# Patient Record
Sex: MALE | Employment: FULL TIME | URBAN - METROPOLITAN AREA
[De-identification: names, ages, dates, MRNs, and addresses within clinical notes are randomized per-mention and may not be internally consistent; named-entity substitution may affect disease eponyms.]

---

## 2017-02-09 ENCOUNTER — OFFICE VISIT (OUTPATIENT)
Dept: FAMILY MEDICINE CLINIC | Facility: CLINIC | Age: 47
End: 2017-02-09

## 2017-02-09 VITALS
TEMPERATURE: 98 F | BODY MASS INDEX: 23.19 KG/M2 | RESPIRATION RATE: 18 BRPM | WEIGHT: 162 LBS | DIASTOLIC BLOOD PRESSURE: 73 MMHG | SYSTOLIC BLOOD PRESSURE: 106 MMHG | HEART RATE: 66 BPM | HEIGHT: 70 IN

## 2017-02-09 DIAGNOSIS — J02.9 SORE THROAT: ICD-10-CM

## 2017-02-09 PROCEDURE — 99212 OFFICE O/P EST SF 10 MIN: CPT | Performed by: FAMILY MEDICINE

## 2017-02-09 PROCEDURE — 99213 OFFICE O/P EST LOW 20 MIN: CPT | Performed by: FAMILY MEDICINE

## 2017-02-09 RX ORDER — AMOXICILLIN 875 MG/1
875 TABLET, COATED ORAL 2 TIMES DAILY
Qty: 20 TABLET | Refills: 0 | Status: SHIPPED | OUTPATIENT
Start: 2017-02-09 | End: 2017-05-25

## 2017-02-09 NOTE — PROGRESS NOTES
HPI:    Patient ID: Gibson Him is a 40year old male. HPI Comments: Pt presents with cold symptoms for 4-5 days. Pt has had cough, sore throat. No fevers. Pt has tried otc remedies without relief. Pt states several sick contacts.      Sore Throat defined types were placed in this encounter. Meds This Visit:  Signed Prescriptions Disp Refills    amoxicillin 875 MG Oral Tab 20 tablet 0      Sig: Take 1 tablet (875 mg total) by mouth 2 (two) times daily.            Imaging & Referrals:  None

## 2017-02-21 NOTE — TELEPHONE ENCOUNTER
Pharmacy stts they tried to escript Rx Zolpidem 10 mg.  Please advise         Current Outpatient Prescriptions:        Zolpidem Tartrate 10 MG Oral Tab TAKE 1 TABLET BY MOUTH EVERY NIGHT AT BEDTIME Disp: 90 tablet Rfl: 0

## 2017-02-26 RX ORDER — ZOLPIDEM TARTRATE 10 MG/1
TABLET ORAL
Qty: 90 TABLET | Refills: 0 | Status: CANCELLED | OUTPATIENT
Start: 2017-02-26

## 2017-02-26 NOTE — TELEPHONE ENCOUNTER
Called pharmacy and this was already filled for #30 on 2/19 and too early to refill. Can notify patient about this.

## 2017-02-26 NOTE — TELEPHONE ENCOUNTER
Please advise on RF    · Appointment scheduled in the past 6 months or in the next 3 months  Recent Visits       Provider Department Primary Dx    2 weeks ago Ramon Suero MD [facility] SPECIALTY [facility], [facility] [place], [place] Sore throat    1 month ago Lanie Levin

## 2017-02-28 NOTE — TELEPHONE ENCOUNTER
Pharmacy was the one who had sent refill request and so there is no need to call pt about this. Closing encounter.

## 2017-04-17 ENCOUNTER — OFFICE VISIT (OUTPATIENT)
Dept: FAMILY MEDICINE CLINIC | Facility: CLINIC | Age: 47
End: 2017-04-17

## 2017-04-17 VITALS
SYSTOLIC BLOOD PRESSURE: 129 MMHG | DIASTOLIC BLOOD PRESSURE: 77 MMHG | TEMPERATURE: 98 F | BODY MASS INDEX: 22.62 KG/M2 | HEART RATE: 65 BPM | HEIGHT: 70 IN | WEIGHT: 158 LBS

## 2017-04-17 DIAGNOSIS — Z00.00 ROUTINE GENERAL MEDICAL EXAMINATION AT A HEALTH CARE FACILITY: ICD-10-CM

## 2017-04-17 DIAGNOSIS — R20.9 COLD HANDS AND FEET: Primary | ICD-10-CM

## 2017-04-17 DIAGNOSIS — H65.01 RIGHT ACUTE SEROUS OTITIS MEDIA, RECURRENCE NOT SPECIFIED: ICD-10-CM

## 2017-04-17 PROCEDURE — 99213 OFFICE O/P EST LOW 20 MIN: CPT | Performed by: PHYSICIAN ASSISTANT

## 2017-04-17 PROCEDURE — 99212 OFFICE O/P EST SF 10 MIN: CPT | Performed by: PHYSICIAN ASSISTANT

## 2017-04-17 RX ORDER — FLUTICASONE PROPIONATE 50 MCG
2 SPRAY, SUSPENSION (ML) NASAL DAILY
Qty: 1 BOTTLE | Refills: 1 | Status: SHIPPED | OUTPATIENT
Start: 2017-04-17 | End: 2017-10-03

## 2017-04-17 NOTE — PROGRESS NOTES
HPI:    Patient ID: Kade Cruz is a 40year old male. HPI Comments: Patient presents for feeling \"something moving\" inside right ear for past week. He has some mild discomfort in ear. He denies muffled hearing or popping in ear.   He denies all well-nourished. No distress. HENT:   Head: Normocephalic and atraumatic. Right Ear: Ear canal normal. A middle ear effusion is present.    Left Ear: Tympanic membrane and ear canal normal.   Mouth/Throat: Oropharynx is clear and moist and mucous membran

## 2017-04-18 ENCOUNTER — LAB ENCOUNTER (OUTPATIENT)
Dept: LAB | Age: 47
End: 2017-04-18
Attending: PHYSICIAN ASSISTANT
Payer: COMMERCIAL

## 2017-04-18 DIAGNOSIS — Z00.00 ROUTINE GENERAL MEDICAL EXAMINATION AT A HEALTH CARE FACILITY: ICD-10-CM

## 2017-04-18 DIAGNOSIS — R20.9 COLD HANDS AND FEET: ICD-10-CM

## 2017-04-18 PROCEDURE — 80061 LIPID PANEL: CPT

## 2017-04-18 PROCEDURE — 36415 COLL VENOUS BLD VENIPUNCTURE: CPT

## 2017-04-18 PROCEDURE — 80053 COMPREHEN METABOLIC PANEL: CPT

## 2017-04-18 PROCEDURE — 85025 COMPLETE CBC W/AUTO DIFF WBC: CPT

## 2017-04-18 PROCEDURE — 84443 ASSAY THYROID STIM HORMONE: CPT

## 2017-05-20 RX ORDER — ZOLPIDEM TARTRATE 10 MG/1
TABLET ORAL
Qty: 90 TABLET | Refills: 0 | OUTPATIENT
Start: 2017-05-20 | End: 2017-08-23

## 2017-05-20 NOTE — TELEPHONE ENCOUNTER
Message noted. Chart reviewed. May refill medication as requested. Script reviewed and signed. Please call into pharmacy as this is controlled substance and notify patient. Thanks.

## 2017-05-25 ENCOUNTER — OFFICE VISIT (OUTPATIENT)
Dept: FAMILY MEDICINE CLINIC | Facility: CLINIC | Age: 47
End: 2017-05-25

## 2017-05-25 VITALS
HEIGHT: 70 IN | WEIGHT: 161 LBS | BODY MASS INDEX: 23.05 KG/M2 | DIASTOLIC BLOOD PRESSURE: 72 MMHG | SYSTOLIC BLOOD PRESSURE: 116 MMHG | TEMPERATURE: 98 F | HEART RATE: 63 BPM | RESPIRATION RATE: 18 BRPM

## 2017-05-25 DIAGNOSIS — H65.91 RIGHT SEROUS OTITIS MEDIA, UNSPECIFIED CHRONICITY: Primary | ICD-10-CM

## 2017-05-25 PROCEDURE — 99212 OFFICE O/P EST SF 10 MIN: CPT | Performed by: FAMILY MEDICINE

## 2017-05-25 PROCEDURE — 99213 OFFICE O/P EST LOW 20 MIN: CPT | Performed by: FAMILY MEDICINE

## 2017-05-25 NOTE — PROGRESS NOTES
HPI:    Patient ID: Jessica Duque is a 40year old male. HPI Comments: Pt presents with intermittent right ear fullness and dry mouth. . Pt  also takes medication for insomnia which usually helps. Pt has had some increased sensitivity to noises.  Pt w encounter.        Meds This Visit:  No prescriptions requested or ordered in this encounter    Imaging & Referrals:  ENT - INTERNAL       ER#[id number]

## 2017-08-23 RX ORDER — ZOLPIDEM TARTRATE 10 MG/1
TABLET ORAL
Qty: 90 TABLET | Refills: 0 | Status: SHIPPED
Start: 2017-08-23 | End: 2017-11-27

## 2017-10-03 ENCOUNTER — OFFICE VISIT (OUTPATIENT)
Dept: FAMILY MEDICINE CLINIC | Facility: CLINIC | Age: 47
End: 2017-10-03

## 2017-10-03 VITALS
WEIGHT: 157 LBS | DIASTOLIC BLOOD PRESSURE: 72 MMHG | HEIGHT: 70 IN | SYSTOLIC BLOOD PRESSURE: 103 MMHG | HEART RATE: 61 BPM | TEMPERATURE: 98 F | BODY MASS INDEX: 22.48 KG/M2

## 2017-10-03 DIAGNOSIS — M77.30 CALCANEAL SPUR, UNSPECIFIED LATERALITY: ICD-10-CM

## 2017-10-03 DIAGNOSIS — J06.9 ACUTE URI: ICD-10-CM

## 2017-10-03 PROCEDURE — 99213 OFFICE O/P EST LOW 20 MIN: CPT | Performed by: FAMILY MEDICINE

## 2017-10-03 PROCEDURE — 99212 OFFICE O/P EST SF 10 MIN: CPT | Performed by: FAMILY MEDICINE

## 2017-10-03 NOTE — PROGRESS NOTES
HPI:    Patient ID: Teddy Polanco is a 56year old male. Pt presents with cold symptoms for 4 days. Pt has had cough, congestion, sore throat. No fevers. Pt has tried otc remedies without relief. Pt states no sick contacts.    Pt also has had hx of he JZ#[id number]

## 2017-11-27 RX ORDER — ZOLPIDEM TARTRATE 10 MG/1
TABLET ORAL
Qty: 90 TABLET | Refills: 0 | Status: SHIPPED
Start: 2017-11-27 | End: 2018-02-26

## 2017-12-07 ENCOUNTER — OFFICE VISIT (OUTPATIENT)
Dept: PODIATRY CLINIC | Facility: CLINIC | Age: 47
End: 2017-12-07

## 2017-12-07 DIAGNOSIS — M72.2 PLANTAR FASCIITIS OF RIGHT FOOT: Primary | ICD-10-CM

## 2017-12-07 PROCEDURE — 99243 OFF/OP CNSLTJ NEW/EST LOW 30: CPT | Performed by: PODIATRIST

## 2017-12-07 PROCEDURE — L3060 FOOT ARCH SUPP LONGITUD/META: HCPCS | Performed by: PODIATRIST

## 2017-12-07 NOTE — PROGRESS NOTES
HPI:    Patient ID: Kade Cruz is a 56year old male. HPI   This 43-year-old female presents as a new patient to me and is here on consult from [facility]. Her chief complaint is pain associated with the right heel.   The pain is been present for a encounter    Imaging & Referrals:  None       #[id number]

## 2018-01-04 ENCOUNTER — OFFICE VISIT (OUTPATIENT)
Dept: PODIATRY CLINIC | Facility: CLINIC | Age: 48
End: 2018-01-04

## 2018-01-04 DIAGNOSIS — M72.2 PLANTAR FASCIITIS OF RIGHT FOOT: Primary | ICD-10-CM

## 2018-01-04 PROCEDURE — 99212 OFFICE O/P EST SF 10 MIN: CPT | Performed by: PODIATRIST

## 2018-01-04 NOTE — PROGRESS NOTES
HPI:    Patient ID: Tom Us is a 56year old male. HPI  This 56-year-old male presents for follow-up and further considerations of care in reference to right heel pain.   Patient states he is doing very well and has minimal discomfort and in act

## 2018-01-25 ENCOUNTER — OFFICE VISIT (OUTPATIENT)
Dept: PODIATRY CLINIC | Facility: CLINIC | Age: 48
End: 2018-01-25

## 2018-01-25 DIAGNOSIS — M72.2 PLANTAR FASCIITIS OF RIGHT FOOT: Primary | ICD-10-CM

## 2018-01-25 PROCEDURE — 99212 OFFICE O/P EST SF 10 MIN: CPT | Performed by: PODIATRIST

## 2018-01-25 NOTE — PROGRESS NOTES
HPI:    Patient ID: Cora Moise is a 56year old male. HPI  This 52-year-old male presents for follow-up care in reference to right heel pain. Since last visit he states that he continues to steadily improve and is getting better.   He states that

## 2018-02-27 RX ORDER — ZOLPIDEM TARTRATE 10 MG/1
TABLET ORAL
Qty: 90 TABLET | Refills: 0 | Status: SHIPPED | OUTPATIENT
Start: 2018-02-27 | End: 2018-06-06

## 2018-04-04 ENCOUNTER — OFFICE VISIT (OUTPATIENT)
Dept: FAMILY MEDICINE CLINIC | Facility: CLINIC | Age: 48
End: 2018-04-04

## 2018-04-04 VITALS
TEMPERATURE: 98 F | SYSTOLIC BLOOD PRESSURE: 101 MMHG | BODY MASS INDEX: 22.19 KG/M2 | DIASTOLIC BLOOD PRESSURE: 67 MMHG | HEIGHT: 70 IN | WEIGHT: 155 LBS | HEART RATE: 61 BPM

## 2018-04-04 DIAGNOSIS — R68.89 FLU-LIKE SYMPTOMS: ICD-10-CM

## 2018-04-04 DIAGNOSIS — G47.00 INSOMNIA, UNSPECIFIED TYPE: Primary | ICD-10-CM

## 2018-04-04 PROCEDURE — 99213 OFFICE O/P EST LOW 20 MIN: CPT | Performed by: PHYSICIAN ASSISTANT

## 2018-04-04 PROCEDURE — 99212 OFFICE O/P EST SF 10 MIN: CPT | Performed by: PHYSICIAN ASSISTANT

## 2018-04-04 RX ORDER — IBUPROFEN 600 MG/1
600 TABLET ORAL EVERY 6 HOURS PRN
Qty: 30 TABLET | Refills: 1 | Status: SHIPPED | OUTPATIENT
Start: 2018-04-04 | End: 2018-07-27

## 2018-04-04 NOTE — PROGRESS NOTES
HPI:    Patient ID: Aag Jeffrey is a 56year old male. Patient presents for fever, body aches, fatigue and cough for past five days. Patient feels symptoms have almost completely resolved today. He denies congestion.   He had sore throat that has and well-nourished. No distress. HENT:   Head: Normocephalic and atraumatic.    Right Ear: Tympanic membrane and ear canal normal.   Left Ear: Tympanic membrane and ear canal normal.   Mouth/Throat: Uvula is midline, oropharynx is clear and moist and muco

## 2018-06-06 RX ORDER — ZOLPIDEM TARTRATE 10 MG/1
TABLET ORAL
Qty: 90 TABLET | Refills: 0 | Status: SHIPPED
Start: 2018-06-06 | End: 2018-09-07

## 2018-06-06 NOTE — TELEPHONE ENCOUNTER
Rx script faxed to [facility], confirmation received.

## 2018-07-27 ENCOUNTER — OFFICE VISIT (OUTPATIENT)
Dept: FAMILY MEDICINE CLINIC | Facility: CLINIC | Age: 48
End: 2018-07-27

## 2018-07-27 VITALS
SYSTOLIC BLOOD PRESSURE: 111 MMHG | BODY MASS INDEX: 22 KG/M2 | TEMPERATURE: 98 F | DIASTOLIC BLOOD PRESSURE: 64 MMHG | HEART RATE: 51 BPM | WEIGHT: 154 LBS

## 2018-07-27 DIAGNOSIS — Z98.818 STATUS POST WISDOM TOOTH EXTRACTION: Primary | ICD-10-CM

## 2018-07-27 PROCEDURE — 99212 OFFICE O/P EST SF 10 MIN: CPT | Performed by: PHYSICIAN ASSISTANT

## 2018-07-27 PROCEDURE — 99213 OFFICE O/P EST LOW 20 MIN: CPT | Performed by: PHYSICIAN ASSISTANT

## 2018-07-27 RX ORDER — AMOXICILLIN 500 MG/1
CAPSULE ORAL
Refills: 0 | COMMUNITY
Start: 2018-07-21 | End: 2018-08-14

## 2018-07-27 RX ORDER — HYDROCODONE BITARTRATE AND ACETAMINOPHEN 5; 325 MG/1; MG/1
1 TABLET ORAL EVERY 6 HOURS PRN
Qty: 20 TABLET | Refills: 0 | Status: SHIPPED | OUTPATIENT
Start: 2018-07-27 | End: 2019-03-19

## 2018-07-27 RX ORDER — ACETAMINOPHEN AND CODEINE PHOSPHATE 300; 30 MG/1; MG/1
TABLET ORAL
Refills: 0 | COMMUNITY
Start: 2018-07-21 | End: 2019-03-19

## 2018-07-27 RX ORDER — IBUPROFEN 600 MG/1
600 TABLET ORAL EVERY 6 HOURS PRN
Qty: 30 TABLET | Refills: 1 | Status: SHIPPED | OUTPATIENT
Start: 2018-07-27 | End: 2019-03-19

## 2018-07-27 NOTE — PROGRESS NOTES
HPI:    Patient ID: Cora Moise is a 53year old male. Patient presents for pain in left side lower jaw for past three days since having wisdom tooth extracted. Patient is currently on amoxicillin and was also prescribed Tylenol 3 for pain.   He st Skin: Skin is warm and dry. Psychiatric: He has a normal mood and affect. Vitals reviewed.              ASSESSMENT/PLAN:   Status post wisdom tooth extraction  (primary encounter diagnosis)  -Norco 5/325 mg q 6 hours prn #20  -Advised stop Tylenol 3

## 2018-08-14 ENCOUNTER — OFFICE VISIT (OUTPATIENT)
Dept: FAMILY MEDICINE CLINIC | Facility: CLINIC | Age: 48
End: 2018-08-14

## 2018-08-14 VITALS
RESPIRATION RATE: 18 BRPM | SYSTOLIC BLOOD PRESSURE: 115 MMHG | BODY MASS INDEX: 21.9 KG/M2 | HEART RATE: 65 BPM | TEMPERATURE: 99 F | HEIGHT: 70 IN | WEIGHT: 153 LBS | DIASTOLIC BLOOD PRESSURE: 76 MMHG

## 2018-08-14 DIAGNOSIS — J34.89 SINUS PRESSURE: ICD-10-CM

## 2018-08-14 PROCEDURE — 99212 OFFICE O/P EST SF 10 MIN: CPT | Performed by: FAMILY MEDICINE

## 2018-08-14 PROCEDURE — 99213 OFFICE O/P EST LOW 20 MIN: CPT | Performed by: FAMILY MEDICINE

## 2018-08-14 RX ORDER — AMOXICILLIN AND CLAVULANATE POTASSIUM 875; 125 MG/1; MG/1
1 TABLET, FILM COATED ORAL 2 TIMES DAILY
Qty: 20 TABLET | Refills: 0 | Status: SHIPPED | OUTPATIENT
Start: 2018-08-14 | End: 2019-03-19

## 2018-08-14 NOTE — PROGRESS NOTES
HPI:    Patient ID: Tom Us is a 53year old male. Pt had a wisdom tooth pulled a month ago. Pt had some issues after the procedure and was seen by Janae Donato.  Pain is better but has had some sinus congestion and pain but tooth pain resolv exhibits normal muscle tone. Coordination normal.   [place] testing with no reproducible vertigo     Psychiatric: He has a normal mood and affect. His behavior is normal.   Vitals reviewed. ASSESSMENT/PLAN:   Sinus pressure of left side: ?  Al

## 2018-09-08 RX ORDER — ZOLPIDEM TARTRATE 10 MG/1
TABLET ORAL
Qty: 90 TABLET | Refills: 0 | Status: SHIPPED
Start: 2018-09-08 | End: 2018-12-12

## 2018-12-13 RX ORDER — ZOLPIDEM TARTRATE 10 MG/1
TABLET ORAL
Qty: 90 TABLET | Refills: 0 | Status: SHIPPED
Start: 2018-12-13 | End: 2019-03-15

## 2018-12-13 NOTE — TELEPHONE ENCOUNTER
Requested Prescriptions     Pending Prescriptions Disp Refills   • ZOLPIDEM TARTRATE 10 MG Oral Tab [Pharmacy Med Name: ZOLPIDEM 10MG TABLETS] 90 tablet 0     Sig: TAKE 1 TABLET BY MOUTH EVERY DAY AT BEDTIME       Last Office Visit with PCP: 8/14/2018  Rose Mary Trent

## 2019-03-15 NOTE — TELEPHONE ENCOUNTER
Controlled medication pending for review. If approved needs to be called in or faxed by on-site staff.     Last Rx: 12/13/18  LOV: 8/14/18

## 2019-03-16 RX ORDER — ZOLPIDEM TARTRATE 10 MG/1
TABLET ORAL
Qty: 90 TABLET | Refills: 0 | OUTPATIENT
Start: 2019-03-16 | End: 2019-06-18

## 2019-03-16 NOTE — TELEPHONE ENCOUNTER
Please respond to pool: LUZ MARIA Yoon 63 LPN/MIKE--please call in to [address] [facility], [address] [facility]  [address] [facility] 6, [phone], [phone]   Medication Detail      Disp Refills Start

## 2019-03-16 NOTE — TELEPHONE ENCOUNTER
Message noted. Chart reviewed. May refill medication as requested. Script reviewed and signed. Please call into pharmacy as this is controlled substance and I am not in office today to print and fax. Thanks.

## 2019-03-19 ENCOUNTER — OFFICE VISIT (OUTPATIENT)
Dept: FAMILY MEDICINE CLINIC | Facility: CLINIC | Age: 49
End: 2019-03-19

## 2019-03-19 VITALS
BODY MASS INDEX: 23.85 KG/M2 | HEART RATE: 62 BPM | DIASTOLIC BLOOD PRESSURE: 69 MMHG | TEMPERATURE: 98 F | SYSTOLIC BLOOD PRESSURE: 120 MMHG | HEIGHT: 69 IN | RESPIRATION RATE: 18 BRPM | WEIGHT: 161 LBS

## 2019-03-19 DIAGNOSIS — R30.0 BURNING WITH URINATION: Primary | ICD-10-CM

## 2019-03-19 LAB
CONTROL RUN WITHIN 24 HOURS?: YES
GLUCOSE URINE: NEGATIVE
KETONE URINE: NEGATIVE
NITRITE URINE: NEGATIVE
PH URINE: 5 (ref 5–8)
SPEC GRAVITY: 1.03 (ref 1–1.03)
UROBILINOGEN URINE: 0.2

## 2019-03-19 PROCEDURE — 99212 OFFICE O/P EST SF 10 MIN: CPT | Performed by: FAMILY MEDICINE

## 2019-03-19 PROCEDURE — 99213 OFFICE O/P EST LOW 20 MIN: CPT | Performed by: FAMILY MEDICINE

## 2019-03-19 PROCEDURE — 96372 THER/PROPH/DIAG INJ SC/IM: CPT | Performed by: FAMILY MEDICINE

## 2019-03-19 RX ORDER — AZITHROMYCIN 500 MG/1
1000 TABLET, FILM COATED ORAL ONCE
Qty: 2 TABLET | Refills: 0 | Status: SHIPPED | OUTPATIENT
Start: 2019-03-19 | End: 2019-03-19

## 2019-03-19 RX ORDER — CEFTRIAXONE 500 MG/1
500 INJECTION, POWDER, FOR SOLUTION INTRAMUSCULAR; INTRAVENOUS ONCE
Status: COMPLETED | OUTPATIENT
Start: 2019-03-19 | End: 2019-03-19

## 2019-03-19 RX ADMIN — CEFTRIAXONE 500 MG: 500 INJECTION, POWDER, FOR SOLUTION INTRAMUSCULAR; INTRAVENOUS at 14:14:00

## 2019-03-19 NOTE — PROGRESS NOTES
HPI:    Patient ID: Tracy Darden is a 53year old male. Patient presents for painful urination for the past week. He reports increased frequency since this has begun. Denies fevers, abdominal, nausea, vomiting, hematuria, and discharge.  Similar symp will check urine culture and GC/ chlamydia testing; discussed STI testing and pt declined any further; to refrain from sexually activity for now and encouraged safe sexual practices; Follow up as needed.     Orders Placed This Encounter      POCT urinalysis

## 2019-03-20 LAB
C TRACH DNA SPEC QL NAA+PROBE: NEGATIVE
N GONORRHOEA DNA SPEC QL NAA+PROBE: POSITIVE

## 2019-04-24 ENCOUNTER — OFFICE VISIT (OUTPATIENT)
Dept: FAMILY MEDICINE CLINIC | Facility: CLINIC | Age: 49
End: 2019-04-24

## 2019-04-24 VITALS
HEIGHT: 69 IN | RESPIRATION RATE: 16 BRPM | BODY MASS INDEX: 23.99 KG/M2 | TEMPERATURE: 98 F | HEART RATE: 52 BPM | SYSTOLIC BLOOD PRESSURE: 104 MMHG | WEIGHT: 162 LBS | DIASTOLIC BLOOD PRESSURE: 71 MMHG

## 2019-04-24 DIAGNOSIS — Z86.19 HX OF GONORRHEA: ICD-10-CM

## 2019-04-24 DIAGNOSIS — M25.522 LEFT ELBOW PAIN: ICD-10-CM

## 2019-04-24 PROCEDURE — 99212 OFFICE O/P EST SF 10 MIN: CPT | Performed by: FAMILY MEDICINE

## 2019-04-24 PROCEDURE — 99213 OFFICE O/P EST LOW 20 MIN: CPT | Performed by: FAMILY MEDICINE

## 2019-04-24 NOTE — PROGRESS NOTES
HPI:    Patient ID: Viviane Jeffrey is a 53year old male. Pt presents for follow up for his recent STI- gonorrhea. Pt was treated and no symptoms now. Pt also has had an injury of his left elbow a few weeks ago. Pt banged it on a wall.  No swelling b

## 2019-06-18 RX ORDER — ZOLPIDEM TARTRATE 10 MG/1
TABLET ORAL
Qty: 90 TABLET | Refills: 0 | Status: SHIPPED
Start: 2019-06-18 | End: 2019-09-25

## 2019-06-18 NOTE — TELEPHONE ENCOUNTER
Controlled medication pending for review. If approved needs to be called in or faxed by on-site staff.     Last Rx: 3/16/19  LOV: 4/24/19

## 2019-09-26 RX ORDER — ZOLPIDEM TARTRATE 10 MG/1
TABLET ORAL
Qty: 90 TABLET | Refills: 0 | Status: SHIPPED | OUTPATIENT
Start: 2019-09-26 | End: 2019-12-28

## 2019-09-26 NOTE — TELEPHONE ENCOUNTER
Message noted: Chart reviewed and may refill zolpidem as requested times one. Script sent to listed pharmacy by secure method. Please notify patient.

## 2019-09-30 ENCOUNTER — OFFICE VISIT (OUTPATIENT)
Dept: FAMILY MEDICINE CLINIC | Facility: CLINIC | Age: 49
End: 2019-09-30

## 2019-09-30 VITALS
HEART RATE: 57 BPM | HEIGHT: 69 IN | SYSTOLIC BLOOD PRESSURE: 104 MMHG | RESPIRATION RATE: 18 BRPM | BODY MASS INDEX: 24.14 KG/M2 | TEMPERATURE: 98 F | WEIGHT: 163 LBS | DIASTOLIC BLOOD PRESSURE: 73 MMHG

## 2019-09-30 DIAGNOSIS — S56.911A STRAIN OF RIGHT FOREARM, INITIAL ENCOUNTER: Primary | ICD-10-CM

## 2019-09-30 PROCEDURE — 99213 OFFICE O/P EST LOW 20 MIN: CPT | Performed by: FAMILY MEDICINE

## 2019-09-30 NOTE — PROGRESS NOTES
HPI:    Patient ID: Tom Us is a 51year old male. Pt presents with some intermittent pains of his his heel and also had knee pains of the left side. Pt needs work note for this. Pt also hurt his right arm while moving something last month.  Pt

## 2019-12-09 ENCOUNTER — OFFICE VISIT (OUTPATIENT)
Dept: FAMILY MEDICINE CLINIC | Facility: CLINIC | Age: 49
End: 2019-12-09

## 2019-12-09 VITALS
WEIGHT: 163 LBS | RESPIRATION RATE: 18 BRPM | TEMPERATURE: 98 F | HEART RATE: 65 BPM | SYSTOLIC BLOOD PRESSURE: 105 MMHG | DIASTOLIC BLOOD PRESSURE: 71 MMHG | HEIGHT: 69 IN | BODY MASS INDEX: 24.14 KG/M2

## 2019-12-09 DIAGNOSIS — J06.9 ACUTE URI: ICD-10-CM

## 2019-12-09 PROCEDURE — 99213 OFFICE O/P EST LOW 20 MIN: CPT | Performed by: FAMILY MEDICINE

## 2019-12-09 RX ORDER — AZITHROMYCIN 250 MG/1
TABLET, FILM COATED ORAL
Qty: 6 TABLET | Refills: 0 | Status: SHIPPED | OUTPATIENT
Start: 2019-12-09 | End: 2019-12-30 | Stop reason: ALTCHOICE

## 2019-12-09 NOTE — PROGRESS NOTES
HPI:    Patient ID: Cora Moise is a 51year old male. Pt presents with cold symptoms for 2 weeks. Pt has had cough, congestion. No fevers now. Pt has tried otc remedies without relief. Pt states sick contacts.          Review of Systems   Con[employer]ut

## 2019-12-28 RX ORDER — ZOLPIDEM TARTRATE 10 MG/1
TABLET ORAL
Qty: 90 TABLET | Refills: 0 | Status: SHIPPED | OUTPATIENT
Start: 2019-12-28 | End: 2020-03-27

## 2019-12-28 NOTE — TELEPHONE ENCOUNTER
Message noted: Chart reviewed and may refill zolpidem as requested times one. Script sent to listed pharmacy by secure method. Please notify patient. [place]. No concerns noted.

## 2019-12-30 ENCOUNTER — OFFICE VISIT (OUTPATIENT)
Dept: FAMILY MEDICINE CLINIC | Facility: CLINIC | Age: 49
End: 2019-12-30

## 2019-12-30 VITALS
OXYGEN SATURATION: 96 % | RESPIRATION RATE: 18 BRPM | WEIGHT: 164.5 LBS | HEART RATE: 62 BPM | TEMPERATURE: 98 F | HEIGHT: 69 IN | BODY MASS INDEX: 24.37 KG/M2 | SYSTOLIC BLOOD PRESSURE: 94 MMHG | DIASTOLIC BLOOD PRESSURE: 61 MMHG

## 2019-12-30 DIAGNOSIS — J06.9 ACUTE URI: ICD-10-CM

## 2019-12-30 PROCEDURE — 99213 OFFICE O/P EST LOW 20 MIN: CPT | Performed by: FAMILY MEDICINE

## 2019-12-30 NOTE — PROGRESS NOTES
HPI:    Patient ID: Ynes Osborne is a 51year old male. Pt presents with cold symptoms for 7 days. Pt has had sneezing cough, sore throat. had fevers. Pt has tried otc remedies with some relief. Pt states sick contacts through work.    Pt missed work

## 2020-03-12 ENCOUNTER — MED REC SCAN ONLY (OUTPATIENT)
Dept: FAMILY MEDICINE CLINIC | Facility: CLINIC | Age: 50
End: 2020-03-12

## 2020-03-27 RX ORDER — ZOLPIDEM TARTRATE 10 MG/1
TABLET ORAL
Qty: 90 TABLET | Refills: 0 | Status: SHIPPED | OUTPATIENT
Start: 2020-03-27 | End: 2020-07-04

## 2020-03-28 NOTE — TELEPHONE ENCOUNTER
Message noted: Chart reviewed and may refill zolpidem as requested times one. Script sent to listed pharmacy by secure method. Pharmacy to notify patient.

## 2020-04-11 ENCOUNTER — APPOINTMENT (OUTPATIENT)
Dept: GENERAL RADIOLOGY | Facility: HOSPITAL | Age: 50
End: 2020-04-11
Attending: EMERGENCY MEDICINE
Payer: COMMERCIAL

## 2020-04-11 ENCOUNTER — APPOINTMENT (OUTPATIENT)
Dept: CT IMAGING | Facility: HOSPITAL | Age: 50
End: 2020-04-11
Attending: EMERGENCY MEDICINE
Payer: COMMERCIAL

## 2020-04-11 ENCOUNTER — HOSPITAL ENCOUNTER (EMERGENCY)
Facility: HOSPITAL | Age: 50
Discharge: HOME | End: 2020-04-11
Attending: EMERGENCY MEDICINE
Payer: COMMERCIAL

## 2020-04-11 VITALS
DIASTOLIC BLOOD PRESSURE: 69 MMHG | SYSTOLIC BLOOD PRESSURE: 114 MMHG | TEMPERATURE: 98 F | HEART RATE: 64 BPM | HEIGHT: 70 IN | OXYGEN SATURATION: 93 % | BODY MASS INDEX: 22.9 KG/M2 | RESPIRATION RATE: 19 BRPM | WEIGHT: 160 LBS
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
## 2020-04-11 PROCEDURE — 71045 X-RAY EXAM CHEST 1 VIEW: CPT | Performed by: EMERGENCY MEDICINE

## 2020-04-11 PROCEDURE — 70450 CT HEAD/BRAIN W/O DYE: CPT | Performed by: EMERGENCY MEDICINE

## 2020-04-11 PROCEDURE — 73030 X-RAY EXAM OF SHOULDER: CPT | Performed by: EMERGENCY MEDICINE

## 2020-04-11 PROCEDURE — 99284 EMERGENCY DEPT VISIT MOD MDM: CPT
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
## 2020-04-11 NOTE — ED NOTES
Discharge instructions given to pt. Pt verbalized understanding of home care, medication use, and to follow up with pcp. Pt denied further questions or concerns. Pt ambulatory out of ED, discharged in stable condition.
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
## 2020-06-19 ENCOUNTER — NURSE TRIAGE (OUTPATIENT)
Dept: FAMILY MEDICINE CLINIC | Facility: CLINIC | Age: 50
End: 2020-06-19

## 2020-06-19 NOTE — TELEPHONE ENCOUNTER
Okay for tomorrow at 10:00 a.m. If patient does have chest pain that worsens or shortness of breath then should got to the Er.

## 2020-06-19 NOTE — TELEPHONE ENCOUNTER
Marissa Duvall stated that he started to have chest pain a couple days ago. The chest pain is a 7/10 and it last for about 30 min. The chest pain is there in the morning . It has happened 2 mornings already.  No Sob but he felt like he was sweating but he think

## 2020-06-22 ENCOUNTER — OFFICE VISIT (OUTPATIENT)
Dept: FAMILY MEDICINE CLINIC | Facility: CLINIC | Age: 50
End: 2020-06-22

## 2020-06-22 VITALS
TEMPERATURE: 98 F | SYSTOLIC BLOOD PRESSURE: 98 MMHG | WEIGHT: 161 LBS | HEIGHT: 70 IN | HEART RATE: 72 BPM | BODY MASS INDEX: 23.05 KG/M2 | DIASTOLIC BLOOD PRESSURE: 68 MMHG

## 2020-06-22 DIAGNOSIS — M79.641 RIGHT HAND PAIN: ICD-10-CM

## 2020-06-22 PROCEDURE — 99213 OFFICE O/P EST LOW 20 MIN: CPT | Performed by: FAMILY MEDICINE

## 2020-06-22 NOTE — PROGRESS NOTES
HPI:    Patient ID: George Luu is a 53year old male. Pt presents with some various pains. Pt has had some pain of the right hand after using a  at work. Pt states also had right sided chest pains after doing the work.  Chest pains have r

## 2020-07-04 RX ORDER — ZOLPIDEM TARTRATE 10 MG/1
TABLET ORAL
Qty: 30 TABLET | Refills: 0 | Status: SHIPPED | OUTPATIENT
Start: 2020-07-04 | End: 2020-08-03

## 2020-07-04 NOTE — TELEPHONE ENCOUNTER
Message noted: Chart reviewed and may refill medication as requested. Script sent to listed pharmacy by secure method. Pharmacy to notify pt.

## 2020-08-03 RX ORDER — ZOLPIDEM TARTRATE 10 MG/1
TABLET ORAL
Qty: 90 TABLET | Refills: 0 | Status: SHIPPED | OUTPATIENT
Start: 2020-08-03 | End: 2020-11-05

## 2020-08-03 NOTE — TELEPHONE ENCOUNTER
Message noted: Chart reviewed and may refill zolpidem as requested. Script sent to listed pharmacy by secure method. Please notify pt.

## 2020-11-05 RX ORDER — ZOLPIDEM TARTRATE 10 MG/1
TABLET ORAL
Qty: 90 TABLET | Refills: 0 | Status: SHIPPED | OUTPATIENT
Start: 2020-11-05 | End: 2021-02-03

## 2020-11-05 NOTE — TELEPHONE ENCOUNTER
LVM to inform Pt that Rx was sent to the pharmacy.
Message noted: Chart reviewed and may refill zolpidem as requested. Script sent to listed pharmacy by secure method. Please notify pt.
yes

## 2021-02-03 RX ORDER — ZOLPIDEM TARTRATE 10 MG/1
TABLET ORAL
Qty: 90 TABLET | Refills: 0 | Status: SHIPPED | OUTPATIENT
Start: 2021-02-03 | End: 2021-05-13

## 2021-02-03 NOTE — TELEPHONE ENCOUNTER
Message noted: Chart reviewed and may refill zolpidem as requested. Script sent to listed pharmacy by secure method. [place]. No concerns noted.  Please notify patient

## 2021-03-04 ENCOUNTER — OFFICE VISIT (OUTPATIENT)
Dept: FAMILY MEDICINE CLINIC | Facility: CLINIC | Age: 51
End: 2021-03-04

## 2021-03-04 VITALS
SYSTOLIC BLOOD PRESSURE: 102 MMHG | BODY MASS INDEX: 23.77 KG/M2 | DIASTOLIC BLOOD PRESSURE: 67 MMHG | WEIGHT: 166 LBS | TEMPERATURE: 97 F | HEART RATE: 59 BPM | HEIGHT: 70 IN | RESPIRATION RATE: 20 BRPM

## 2021-03-04 DIAGNOSIS — L84 CORN OF FOOT: ICD-10-CM

## 2021-03-04 DIAGNOSIS — K21.9 GASTROESOPHAGEAL REFLUX DISEASE, UNSPECIFIED WHETHER ESOPHAGITIS PRESENT: ICD-10-CM

## 2021-03-04 PROCEDURE — 3008F BODY MASS INDEX DOCD: CPT | Performed by: FAMILY MEDICINE

## 2021-03-04 PROCEDURE — 99213 OFFICE O/P EST LOW 20 MIN: CPT | Performed by: FAMILY MEDICINE

## 2021-03-04 PROCEDURE — 3078F DIAST BP <80 MM HG: CPT | Performed by: FAMILY MEDICINE

## 2021-03-04 PROCEDURE — 3074F SYST BP LT 130 MM HG: CPT | Performed by: FAMILY MEDICINE

## 2021-03-04 RX ORDER — OMEPRAZOLE 40 MG/1
40 CAPSULE, DELAYED RELEASE ORAL DAILY
Qty: 30 CAPSULE | Refills: 2 | Status: SHIPPED | OUTPATIENT
Start: 2021-03-04

## 2021-03-04 NOTE — PROGRESS NOTES
HPI:    Patient ID: Caprice Falcon is a 53year old male. Pt presents with some pain of the left foot over the last month. Pt just got some new shoes. Not sure if he stepped on something. No sig trauma or injury. No redness or fevers.   Pt has had some worse or not better; Follow up in 1-2 weeks if not improved. No orders of the defined types were placed in this encounter.       Meds This Visit:  Requested Prescriptions     Signed Prescriptions Disp Refills   • Omeprazole 40 MG Oral Capsule Delayed

## 2021-04-06 ENCOUNTER — OFFICE VISIT (OUTPATIENT)
Dept: PODIATRY CLINIC | Facility: CLINIC | Age: 51
End: 2021-04-06

## 2021-04-06 VITALS — DIASTOLIC BLOOD PRESSURE: 67 MMHG | RESPIRATION RATE: 16 BRPM | HEART RATE: 60 BPM | SYSTOLIC BLOOD PRESSURE: 103 MMHG

## 2021-04-06 DIAGNOSIS — M79.672 LEFT FOOT PAIN: Primary | ICD-10-CM

## 2021-04-06 DIAGNOSIS — L84 FOOT CALLUS: ICD-10-CM

## 2021-04-06 DIAGNOSIS — L98.9 BENIGN SKIN LESION: ICD-10-CM

## 2021-04-06 PROCEDURE — 3078F DIAST BP <80 MM HG: CPT | Performed by: PODIATRIST

## 2021-04-06 PROCEDURE — 3074F SYST BP LT 130 MM HG: CPT | Performed by: PODIATRIST

## 2021-04-06 PROCEDURE — 99203 OFFICE O/P NEW LOW 30 MIN: CPT | Performed by: PODIATRIST

## 2021-04-06 PROCEDURE — 17110 DESTRUCTION B9 LES UP TO 14: CPT | Performed by: PODIATRIST

## 2021-04-06 NOTE — PROGRESS NOTES
[facility], [facility] Podiatry  Progress Note    Cora Moise is a 53year old male. Patient presents with:  Consult: Lesion on plantar side of left foot -- Rates pain 7/10 at times. Denies any redness or drainage.          HPI:     This is a pleasant male th   Days of Exercise per Week:       Minutes of Exercise per Session:   Stress:       Feeling of Stress :   Social Connections:       Frequency of Communication with Friends and Family:       Frequency of Social Gatherings with Friends and Family:       Bert A1C     No results found.      ASSESSMENT AND PLAN:   Diagnoses and all orders for this visit:    Left foot pain    Benign skin lesion    Foot callus        Plan:     Procedure: (38325) Destruction of benign skin lesion Chemo Sx < 15 lesions   Discussed the

## 2021-04-13 ENCOUNTER — OFFICE VISIT (OUTPATIENT)
Dept: PODIATRY CLINIC | Facility: CLINIC | Age: 51
End: 2021-04-13

## 2021-04-13 DIAGNOSIS — L98.9 BENIGN SKIN LESION: ICD-10-CM

## 2021-04-13 DIAGNOSIS — L84 FOOT CALLUS: ICD-10-CM

## 2021-04-13 DIAGNOSIS — M79.672 LEFT FOOT PAIN: Primary | ICD-10-CM

## 2021-04-13 PROCEDURE — 17110 DESTRUCTION B9 LES UP TO 14: CPT | Performed by: PODIATRIST

## 2021-04-13 NOTE — PROGRESS NOTES
[address] [facility] Podiatry  Progress Note    Robbi Ceja is a 53year old male. Patient presents with: Follow - Up: left foot lesion-pt states the foot feels a lot better, pain 3/10.          HPI:     This is a pleasant male that presents to clinic tonicolás are no color changes. No open lesions.    Nucleated keratotic skin lesion left foot sub 2nd met head measuring approx 0.2x0.2cm  Vascular examination:   DP pulse is 2/4  PT pulse is 2/4  Capillary refill is immediate  Edema is not present bilateral.  Temper

## 2021-05-13 RX ORDER — ZOLPIDEM TARTRATE 10 MG/1
TABLET ORAL
Qty: 90 TABLET | Refills: 0 | Status: SHIPPED | OUTPATIENT
Start: 2021-05-13 | End: 2021-08-10

## 2021-05-17 ENCOUNTER — OFFICE VISIT (OUTPATIENT)
Dept: PODIATRY CLINIC | Facility: CLINIC | Age: 51
End: 2021-05-17

## 2021-05-17 DIAGNOSIS — L98.9 BENIGN SKIN LESION: ICD-10-CM

## 2021-05-17 DIAGNOSIS — M79.672 LEFT FOOT PAIN: Primary | ICD-10-CM

## 2021-05-17 DIAGNOSIS — L84 FOOT CALLUS: ICD-10-CM

## 2021-05-17 PROCEDURE — 17110 DESTRUCTION B9 LES UP TO 14: CPT | Performed by: PODIATRIST

## 2021-05-17 NOTE — PROGRESS NOTES
[facility], [facility] Podiatry  Progress Note    Kvng Trent is a 53year old male. Patient presents with:   Foot Pain: Left foot lesion f/u - states he still has pain rated as 5/10 with walking longer - no drainage,         HPI:     This is a pleasant male positive hair growth. There are no color changes. No open lesions.    Nucleated keratotic skin lesion left foot sub 2nd met head measuring approx 0.1x0.1cm  Vascular examination:   DP pulse is 2/4  PT pulse is 2/4  Capillary refill is immediate  Edema is DPM  5/17/21

## 2021-05-19 ENCOUNTER — VIRTUAL PHONE E/M (OUTPATIENT)
Dept: FAMILY MEDICINE CLINIC | Facility: CLINIC | Age: 51
End: 2021-05-19

## 2021-05-19 ENCOUNTER — TELEPHONE (OUTPATIENT)
Dept: FAMILY MEDICINE CLINIC | Facility: CLINIC | Age: 51
End: 2021-05-19

## 2021-05-19 DIAGNOSIS — R50.9 FEVER, UNSPECIFIED FEVER CAUSE: ICD-10-CM

## 2021-05-19 PROCEDURE — 99212 OFFICE O/P EST SF 10 MIN: CPT | Performed by: FAMILY MEDICINE

## 2021-05-19 NOTE — TELEPHONE ENCOUNTER
Called pt name and date of birth verified informed patient of work excuse letter ready for  at the  per pt verbalized understanding.

## 2021-05-19 NOTE — TELEPHONE ENCOUNTER
Patient is returning phone call and states his employer does not have a fax machine. Patient is requesting call back when note is ready to be picked up at the [facility].

## 2021-05-19 NOTE — PROGRESS NOTES
HPI/Subjective:   Patient ID: Jef Dewitt is a 53year old male. Virtual Telephone Check-In    Jef Dewitt verbally consents to a Virtual/Telephone Check-In visit on 05/19/21.   Patient has been referred to the Health system website at [email or web address] and further management after testing. To continue social distancing and good hygiene. Patient verbalized understanding of recommendations and agrees to plan. Note for work provided. Pt will call back with fax # for work note.       No orders of the defined

## 2021-05-19 NOTE — TELEPHONE ENCOUNTER
Pt contacted. Left message on voicemail to call us back with his employee fax number so that we can fax his work letter.  Awaiting on call back

## 2021-05-20 ENCOUNTER — TELEPHONE (OUTPATIENT)
Dept: FAMILY MEDICINE CLINIC | Facility: CLINIC | Age: 51
End: 2021-05-20

## 2021-05-20 ENCOUNTER — LAB ENCOUNTER (OUTPATIENT)
Dept: LAB | Age: 51
End: 2021-05-20
Attending: FAMILY MEDICINE
Payer: COMMERCIAL

## 2021-05-20 DIAGNOSIS — R50.9 FEVER, UNSPECIFIED FEVER CAUSE: ICD-10-CM

## 2021-05-20 DIAGNOSIS — R50.9 FEVER, UNSPECIFIED FEVER CAUSE: Primary | ICD-10-CM

## 2021-05-20 NOTE — TELEPHONE ENCOUNTER
Pt contacted and states he has cough now. No fevers. Has not done COVID testing yet. COVID order placed for patient and hsas #. Letter rewritten and given to nurse for  at . Should stay at home for now and self isolate.    Patient verbalize

## 2021-05-20 NOTE — TELEPHONE ENCOUNTER
Patient calling to ask if return to work letter can be extended until 5/20/21 as he still does not feel well.  See TE for 5/19/21      Dr. Shon Servin  Please advise  Thank you  Please reply to pool: LUZ MARIA Couch

## 2021-05-24 NOTE — TELEPHONE ENCOUNTER
Patient letter was updated, patient notified that the letter is ready for pick at the [employer] . Per pt will  letter tomorrow.

## 2021-05-24 NOTE — TELEPHONE ENCOUNTER
Patient called and advised he needs the note that is excusing him for 5/19, 5/20, & 5/21. He said the note he has does not have those dates on it. Also it needs to state he is able to be released to return to work today 5/24. Please Advise.

## 2021-05-26 ENCOUNTER — OFFICE VISIT (OUTPATIENT)
Dept: PODIATRY CLINIC | Facility: CLINIC | Age: 51
End: 2021-05-26

## 2021-05-26 DIAGNOSIS — M79.672 LEFT FOOT PAIN: Primary | ICD-10-CM

## 2021-05-26 DIAGNOSIS — L98.9 BENIGN SKIN LESION: ICD-10-CM

## 2021-05-26 DIAGNOSIS — L84 FOOT CALLUS: ICD-10-CM

## 2021-05-26 PROCEDURE — 17110 DESTRUCTION B9 LES UP TO 14: CPT | Performed by: PODIATRIST

## 2021-05-26 NOTE — PROGRESS NOTES
[facility] Podiatry  Progress Note    Loida Celeste is a 53year old male. Patient presents with:   Follow - Up: left foot f/u- acid treatment #3 feels getting better- when stading still feels some discomfort- pain when stating too much-        HPI: Skin appears moist, warm, and supple with positive hair growth. There are no color changes. No open lesions.    Nucleated keratotic skin lesion left foot sub 2nd met head measuring approx 0.2x0.2cm  Vascular examination:   DP pulse is 2/4  PT pulse is 2 issues and agrees to the plan. No follow-ups on file.     Alexa Garay DPM  5/26/21

## 2021-06-14 ENCOUNTER — HOSPITAL ENCOUNTER (OUTPATIENT)
Dept: GENERAL RADIOLOGY | Age: 51
Discharge: HOME | End: 2021-06-14
Attending: PODIATRIST
Payer: COMMERCIAL

## 2021-06-14 ENCOUNTER — OFFICE VISIT (OUTPATIENT)
Dept: PODIATRY CLINIC | Facility: CLINIC | Age: 51
End: 2021-06-14

## 2021-06-14 DIAGNOSIS — L98.9 BENIGN SKIN LESION: ICD-10-CM

## 2021-06-14 DIAGNOSIS — S90.852A FOREIGN BODY IN LEFT FOOT, INITIAL ENCOUNTER: ICD-10-CM

## 2021-06-14 DIAGNOSIS — M79.672 LEFT FOOT PAIN: ICD-10-CM

## 2021-06-14 DIAGNOSIS — M79.672 LEFT FOOT PAIN: Primary | ICD-10-CM

## 2021-06-14 DIAGNOSIS — L84 FOOT CALLUS: ICD-10-CM

## 2021-06-14 PROCEDURE — 10120 INC&RMVL FB SUBQ TISS SMPL: CPT | Performed by: PODIATRIST

## 2021-06-14 PROCEDURE — 73630 X-RAY EXAM OF FOOT: CPT | Performed by: PODIATRIST

## 2021-06-14 NOTE — PROGRESS NOTES
[facility] Podiatry  Progress Note    Ronaldo Galloway is a 53year old male. Patient presents with: Follow - Up: 2 wk f/up after procedure on left foot. denies any pain.          HPI:     This is a pleasant male that presents to clinic today for f/u appears moist, warm, and supple with positive hair growth. There are no color changes. No open lesions.    Nucleated keratotic skin lesion left foot sub 2nd met head measuring approx 0.3x0.3cm, with possible underlying foreign body  Vascular examination: appeared to be hair. There was no adjacent purulence to the foreign body and puncture wound, and no substantial deep probing noted through the puncture wound portal,  No further visible foreign body appreciated.   Irrigation was performed, and antibiotic oi

## 2021-07-13 ENCOUNTER — TELEPHONE (OUTPATIENT)
Dept: FAMILY MEDICINE CLINIC | Facility: CLINIC | Age: 51
End: 2021-07-13

## 2021-07-13 DIAGNOSIS — M79.672 LEFT FOOT PAIN: Primary | ICD-10-CM

## 2021-07-13 NOTE — TELEPHONE ENCOUNTER
Pt has an appt tomorrow 07/14/21 with Dr. Stephenie Mcclelland. Pt needs a referral to be seen.  Thank you

## 2021-08-10 RX ORDER — ZOLPIDEM TARTRATE 10 MG/1
10 TABLET ORAL NIGHTLY
Qty: 90 TABLET | Refills: 0 | Status: SHIPPED | OUTPATIENT
Start: 2021-08-10 | End: 2021-08-12

## 2021-08-10 NOTE — TELEPHONE ENCOUNTER
Please review; protocol failed/no protocol.      Requested Prescriptions   Pending Prescriptions Disp Refills    ZOLPIDEM 10 MG Oral Tab [Pharmacy Med Name: ZOLPIDEM 10MG TABLETS] 90 tablet 0     Sig: TAKE 1 TABLET BY MOUTH DAILY AT BEDTIME        There is

## 2021-08-13 RX ORDER — ZOLPIDEM TARTRATE 10 MG/1
10 TABLET ORAL NIGHTLY
Qty: 30 TABLET | Refills: 1 | Status: SHIPPED | OUTPATIENT
Start: 2021-08-13 | End: 2022-01-07

## 2021-08-13 NOTE — TELEPHONE ENCOUNTER
Please review refill protocol failed/ no protocol  Requested Prescriptions   Pending Prescriptions Disp Refills    ZOLPIDEM 10 MG Oral Tab [Pharmacy Med Name: ZOLPIDEM 10MG TABLETS] 30 tablet 0     Sig: TAKE 1 TABLET BY MOUTH EVERY DAY AT BEDTIME        Th

## 2021-09-03 ENCOUNTER — IMMUNIZATION (OUTPATIENT)
Dept: LAB | Facility: HOSPITAL | Age: 51
End: 2021-09-03
Attending: EMERGENCY MEDICINE
Payer: COMMERCIAL

## 2021-09-03 DIAGNOSIS — Z23 NEED FOR VACCINATION: Primary | ICD-10-CM

## 2021-09-03 PROCEDURE — 0001A SARSCOV2 VAC 30MCG/0.3ML IM: CPT

## 2021-09-20 ENCOUNTER — OFFICE VISIT (OUTPATIENT)
Dept: FAMILY MEDICINE CLINIC | Facility: CLINIC | Age: 51
End: 2021-09-20

## 2021-09-20 VITALS
BODY MASS INDEX: 24.34 KG/M2 | HEART RATE: 60 BPM | TEMPERATURE: 97 F | DIASTOLIC BLOOD PRESSURE: 72 MMHG | SYSTOLIC BLOOD PRESSURE: 118 MMHG | OXYGEN SATURATION: 98 % | WEIGHT: 170 LBS | RESPIRATION RATE: 16 BRPM | HEIGHT: 70 IN

## 2021-09-20 DIAGNOSIS — Z20.822 COVID-19 RULED OUT BY LABORATORY TESTING: Primary | ICD-10-CM

## 2021-09-20 PROCEDURE — 3078F DIAST BP <80 MM HG: CPT | Performed by: NURSE PRACTITIONER

## 2021-09-20 PROCEDURE — 3008F BODY MASS INDEX DOCD: CPT | Performed by: NURSE PRACTITIONER

## 2021-09-20 PROCEDURE — 3074F SYST BP LT 130 MM HG: CPT | Performed by: NURSE PRACTITIONER

## 2021-09-20 PROCEDURE — 99202 OFFICE O/P NEW SF 15 MIN: CPT | Performed by: NURSE PRACTITIONER

## 2021-09-20 NOTE — PROGRESS NOTES
CHIEF COMPLAINT:   Patient presents with:  Covid-19 Test: pt needs a covid test       HPI:   Celestino Pang is a 49year old male who presents for Covid 19 testing, to return to work, as pt is currently between vaccines 1 and 2 and requires testing for C clear nasal discharge, nasal mucosa pink   THROAT: Oral mucosa pink, moist. Posterior pharynx is not erythematous. no exudates. NECK: Supple, non-tender  LUNGS: clear to auscultation bilaterally; good air movement. Breathing is non labored.   CARDIO: RR

## 2021-09-21 ENCOUNTER — TELEPHONE (OUTPATIENT)
Dept: FAMILY MEDICINE CLINIC | Facility: CLINIC | Age: 51
End: 2021-09-21

## 2021-09-21 NOTE — TELEPHONE ENCOUNTER
Dr Akiko Tam: Patient asked if you can provide an excuse not for yesterday, he missed work due to headache.      he went to walk in clinic yesterday because he c/o headaches. He was tested for covid and waiting for results.      He has been having headaches and d

## 2021-09-22 LAB — SARS-COV-2 RNA RESP QL NAA+PROBE: NOT DETECTED

## 2021-09-24 ENCOUNTER — TELEPHONE (OUTPATIENT)
Dept: FAMILY MEDICINE CLINIC | Facility: CLINIC | Age: 51
End: 2021-09-24

## 2021-09-24 ENCOUNTER — IMMUNIZATION (OUTPATIENT)
Dept: LAB | Facility: HOSPITAL | Age: 51
End: 2021-09-24
Attending: EMERGENCY MEDICINE
Payer: COMMERCIAL

## 2021-09-24 DIAGNOSIS — J06.9 ACUTE URI: Primary | ICD-10-CM

## 2021-09-24 DIAGNOSIS — Z23 NEED FOR VACCINATION: Primary | ICD-10-CM

## 2021-09-24 PROCEDURE — 0002A SARSCOV2 VAC 30MCG/0.3ML IM: CPT

## 2022-01-07 RX ORDER — ZOLPIDEM TARTRATE 10 MG/1
TABLET ORAL
Qty: 90 TABLET | Refills: 0 | Status: SHIPPED | OUTPATIENT
Start: 2022-01-07

## 2022-01-08 NOTE — TELEPHONE ENCOUNTER
Message noted: Chart reviewed and may refill medication as requested. Script sent to listed pharmacy by secure method.     Pt notified through [facility]

## 2022-04-11 RX ORDER — ZOLPIDEM TARTRATE 10 MG/1
10 TABLET ORAL NIGHTLY
Qty: 90 TABLET | Refills: 0 | Status: SHIPPED | OUTPATIENT
Start: 2022-04-11

## 2022-04-11 NOTE — TELEPHONE ENCOUNTER
Message noted: Chart reviewed and may refill medication as requested. Pt due for appt. Script sent to listed pharmacy by secure method.     Pt notified through [facility]

## 2022-06-07 ENCOUNTER — OFFICE VISIT (OUTPATIENT)
Dept: FAMILY MEDICINE CLINIC | Facility: CLINIC | Age: 52
End: 2022-06-07
Payer: COMMERCIAL

## 2022-06-07 ENCOUNTER — LAB ENCOUNTER (OUTPATIENT)
Dept: LAB | Age: 52
End: 2022-06-07
Attending: FAMILY MEDICINE
Payer: COMMERCIAL

## 2022-06-07 VITALS
RESPIRATION RATE: 16 BRPM | SYSTOLIC BLOOD PRESSURE: 115 MMHG | DIASTOLIC BLOOD PRESSURE: 72 MMHG | BODY MASS INDEX: 25.48 KG/M2 | HEART RATE: 71 BPM | WEIGHT: 178 LBS | TEMPERATURE: 98 F | HEIGHT: 70 IN

## 2022-06-07 DIAGNOSIS — R05.9 COUGH: Primary | ICD-10-CM

## 2022-06-07 DIAGNOSIS — R05.9 COUGH: ICD-10-CM

## 2022-06-07 LAB — SARS-COV-2 RNA RESP QL NAA+PROBE: NOT DETECTED

## 2022-06-07 PROCEDURE — 3008F BODY MASS INDEX DOCD: CPT | Performed by: FAMILY MEDICINE

## 2022-06-07 PROCEDURE — 99213 OFFICE O/P EST LOW 20 MIN: CPT | Performed by: FAMILY MEDICINE

## 2022-06-07 PROCEDURE — 3078F DIAST BP <80 MM HG: CPT | Performed by: FAMILY MEDICINE

## 2022-06-07 PROCEDURE — 3074F SYST BP LT 130 MM HG: CPT | Performed by: FAMILY MEDICINE

## 2022-06-07 RX ORDER — AZITHROMYCIN 250 MG/1
TABLET, FILM COATED ORAL
Qty: 6 TABLET | Refills: 0 | Status: SHIPPED | OUTPATIENT
Start: 2022-06-07 | End: 2022-06-12

## 2022-06-08 ENCOUNTER — TELEPHONE (OUTPATIENT)
Dept: FAMILY MEDICINE CLINIC | Facility: CLINIC | Age: 52
End: 2022-06-08

## 2022-07-05 RX ORDER — ZOLPIDEM TARTRATE 10 MG/1
TABLET ORAL
Qty: 90 TABLET | Refills: 0 | Status: SHIPPED | OUTPATIENT
Start: 2022-07-05

## 2022-07-05 NOTE — TELEPHONE ENCOUNTER
Message noted: Chart reviewed and may refill medication as requested. Script sent to listed pharmacy by secure method.     Pt notified through [facility]

## 2022-10-05 RX ORDER — ZOLPIDEM TARTRATE 10 MG/1
10 TABLET ORAL NIGHTLY
Qty: 90 TABLET | Refills: 0 | Status: SHIPPED | OUTPATIENT
Start: 2022-10-05

## 2022-10-05 NOTE — TELEPHONE ENCOUNTER
Message noted: Chart reviewed and may refill medication as requested. Script sent to listed pharmacy by secure method.     Pt notified through [facility]

## 2022-10-05 NOTE — TELEPHONE ENCOUNTER
Please review. Protocol failed / No protocol. Requested Prescriptions   Pending Prescriptions Disp Refills    ZOLPIDEM 10 MG Oral Tab [Pharmacy Med Name: ZOLPIDEM 10MG TABLETS] 90 tablet 0     Sig: TAKE 1 TABLET(10 MG) BY MOUTH EVERY NIGHT        There is no refill protocol information for this order           Recent Outpatient Visits              4 months ago Cough    Juan Jose Hood MD    Office Visit    1 year ago [phone] ruled out by laboratory testing    [address] [facility] [place], [facility]    Office Visit    1 year ago Left foot pain    [address] [facility], [place], [place], [place]    Office Visit    1 year ago Left foot pain    [address] [facility]  [address], [place], [place], [place]    Office Visit    1 year ago Fever, unspecified fever cause    Juan Jose Hood MD    [facility]

## 2023-01-01 NOTE — TELEPHONE ENCOUNTER
Please review. Protocol failed / No protocol. Requested Prescriptions   Pending Prescriptions Disp Refills    ZOLPIDEM 10 MG Oral Tab [Pharmacy Med Name: ZOLPIDEM 10MG TABLETS] 90 tablet 0     Sig: TAKE 1 TABLET(10 MG) BY MOUTH EVERY NIGHT       There is no refill protocol information for this order          Recent Outpatient Visits              6 months ago Cough    Reggie Ramirez MD    Office Visit    1 year ago [phone] ruled out by laboratory testing    [address] [facility] MARISSA Luque    Office Visit    1 year ago Left foot pain    [facility]. [address], [place], [place]    Office Visit    1 year ago Left foot pain    [facility].  [address], [place], [place]    Office Visit    1 year ago Fever, unspecified fever cause    Reggie Ramirez MD    [facility]

## 2023-01-02 RX ORDER — ZOLPIDEM TARTRATE 10 MG/1
TABLET ORAL
Qty: 90 TABLET | Refills: 0 | Status: SHIPPED | OUTPATIENT
Start: 2023-01-02

## 2023-01-25 ENCOUNTER — OFFICE VISIT (OUTPATIENT)
Dept: FAMILY MEDICINE CLINIC | Facility: CLINIC | Age: 53
End: 2023-01-25

## 2023-01-25 VITALS
SYSTOLIC BLOOD PRESSURE: 114 MMHG | HEART RATE: 61 BPM | DIASTOLIC BLOOD PRESSURE: 73 MMHG | TEMPERATURE: 97 F | HEIGHT: 70 IN | WEIGHT: 169 LBS | RESPIRATION RATE: 16 BRPM | BODY MASS INDEX: 24.2 KG/M2

## 2023-01-25 DIAGNOSIS — L98.9 SKIN LESION OF BACK: ICD-10-CM

## 2023-01-25 DIAGNOSIS — M79.671 RIGHT FOOT PAIN: Primary | ICD-10-CM

## 2023-01-25 PROCEDURE — 3074F SYST BP LT 130 MM HG: CPT | Performed by: FAMILY MEDICINE

## 2023-01-25 PROCEDURE — 3008F BODY MASS INDEX DOCD: CPT | Performed by: FAMILY MEDICINE

## 2023-01-25 PROCEDURE — 99213 OFFICE O/P EST LOW 20 MIN: CPT | Performed by: FAMILY MEDICINE

## 2023-01-25 PROCEDURE — 3078F DIAST BP <80 MM HG: CPT | Performed by: FAMILY MEDICINE

## 2023-03-09 ENCOUNTER — OFFICE VISIT (OUTPATIENT)
Dept: DERMATOLOGY CLINIC | Facility: CLINIC | Age: 53
End: 2023-03-09

## 2023-03-09 DIAGNOSIS — D49.2 NEOPLASM OF UNSPECIFIED BEHAVIOR OF BONE, SOFT TISSUE, AND SKIN: Primary | ICD-10-CM

## 2023-03-09 PROCEDURE — 11102 TANGNTL BX SKIN SINGLE LES: CPT | Performed by: STUDENT IN AN ORGANIZED HEALTH CARE EDUCATION/TRAINING PROGRAM

## 2023-03-09 PROCEDURE — 88305 TISSUE EXAM BY PATHOLOGIST: CPT | Performed by: STUDENT IN AN ORGANIZED HEALTH CARE EDUCATION/TRAINING PROGRAM

## 2023-03-09 PROCEDURE — 88304 TISSUE EXAM BY PATHOLOGIST: CPT | Performed by: STUDENT IN AN ORGANIZED HEALTH CARE EDUCATION/TRAINING PROGRAM

## 2023-04-03 RX ORDER — ZOLPIDEM TARTRATE 10 MG/1
TABLET ORAL
Qty: 90 TABLET | Refills: 0 | Status: SHIPPED | OUTPATIENT
Start: 2023-04-03

## 2023-04-03 NOTE — TELEPHONE ENCOUNTER
Message noted: Chart reviewed and may refill medication as requested. Script sent to listed pharmacy by secure method.     Pt notified through [facility] 3.02

## 2023-06-28 ENCOUNTER — TELEPHONE (OUTPATIENT)
Dept: FAMILY MEDICINE CLINIC | Facility: CLINIC | Age: 53
End: 2023-06-28

## 2023-06-28 RX ORDER — ZOLPIDEM TARTRATE 10 MG/1
10 TABLET ORAL NIGHTLY
Qty: 90 TABLET | Refills: 0 | Status: SHIPPED | OUTPATIENT
Start: 2023-06-28

## 2023-09-11 ENCOUNTER — TELEPHONE (OUTPATIENT)
Dept: FAMILY MEDICINE CLINIC | Facility: CLINIC | Age: 53
End: 2023-09-11

## 2023-09-21 RX ORDER — ZOLPIDEM TARTRATE 10 MG/1
10 TABLET ORAL NIGHTLY
Qty: 90 TABLET | Refills: 0 | Status: SHIPPED | OUTPATIENT
Start: 2023-09-21

## 2023-09-21 NOTE — TELEPHONE ENCOUNTER
Please review. Protocol failed / Has no protocol.      Requested Prescriptions   Pending Prescriptions Disp Refills    ZOLPIDEM 10 MG Oral Tab [Pharmacy Med Name: ZOLPIDEM 10MG TABLETS] 90 tablet 0     Sig: TAKE 1 TABLET(10 MG) BY MOUTH EVERY NIGHT       There is no refill protocol information for this order           Recent Outpatient Visits              6 months ago Neoplasm of unspecified behavior of bone, soft tissue, and skin    [facility], [address] [facility] Katie Celis MD    Office Visit    7 months ago Right foot pain    Angela Rouse MD    Office Visit    1 year ago Cough    Angela Rouse MD    Office Visit    2 years ago COVID-23 ruled out by laboratory testing    [facility], [address] MARISSA Simon    Office Visit    2 years ago Left foot pain    [facility], [address], [place], [place], [place]    Office Visit

## 2023-09-22 ENCOUNTER — TELEPHONE (OUTPATIENT)
Dept: FAMILY MEDICINE CLINIC | Facility: CLINIC | Age: 53
End: 2023-09-22

## 2023-09-22 NOTE — TELEPHONE ENCOUNTER
Patient is calling and states he needs a drs note to provide for his ob to return back to work on Monday. He states that he can pick it up in office on Monday if needed.

## 2023-09-26 ENCOUNTER — OFFICE VISIT (OUTPATIENT)
Dept: FAMILY MEDICINE CLINIC | Facility: CLINIC | Age: 53
End: 2023-09-26

## 2023-09-26 VITALS
BODY MASS INDEX: 25.48 KG/M2 | HEIGHT: 70 IN | TEMPERATURE: 98 F | HEART RATE: 76 BPM | WEIGHT: 178 LBS | DIASTOLIC BLOOD PRESSURE: 69 MMHG | SYSTOLIC BLOOD PRESSURE: 122 MMHG | RESPIRATION RATE: 16 BRPM

## 2023-09-26 DIAGNOSIS — S46.911A STRAIN OF RIGHT SHOULDER, INITIAL ENCOUNTER: Primary | ICD-10-CM

## 2023-09-26 PROCEDURE — 3074F SYST BP LT 130 MM HG: CPT | Performed by: FAMILY MEDICINE

## 2023-09-26 PROCEDURE — 99213 OFFICE O/P EST LOW 20 MIN: CPT | Performed by: FAMILY MEDICINE

## 2023-09-26 PROCEDURE — 3008F BODY MASS INDEX DOCD: CPT | Performed by: FAMILY MEDICINE

## 2023-09-26 PROCEDURE — 3078F DIAST BP <80 MM HG: CPT | Performed by: FAMILY MEDICINE

## 2023-09-26 NOTE — PROGRESS NOTES
Subjective:   Patient ID: Aissatou Lopez is a 47year old male. Pt presents with an injury at work. Pt fell and tried to grab something to break fall. Pt states he has had soreness of right shoulder and upper arm and needs note for work  Pt states has been taking aspirin and feeling better. Pt states he wants to return to work today. History/Other:   Review of Systems  Current Outpatient Medications   Medication Sig Dispense Refill    zolpidem 10 MG Oral Tab Take 1 tablet (10 mg total) by mouth nightly. 90 tablet 0    Omeprazole 40 MG Oral Capsule Delayed Release Take 1 capsule (40 mg total) by mouth daily. (Patient not taking: Reported on 3/9/2023) 30 capsule 2     Allergies:No Known Allergies    Objective:   Physical Exam  Constitutional:       Appearance: Normal appearance. Musculoskeletal:      Right shoulder: No swelling or deformity. Normal range of motion. Right upper arm: No swelling, deformity, tenderness or bony tenderness. Comments: Some soreness with movement. Non-tender. ROM good. Neurovascular intact/ normal.      Neurological:      Mental Status: He is alert. Assessment & Plan:   Strain of right shoulder, initial encounter: improving and no sig symptoms now:  - After discussion with patient, to monitor for symptoms and call if any significant symptoms; Note for work provided. Follow up as needed. No orders of the defined types were placed in this encounter.       Meds This Visit:  Requested Prescriptions      No prescriptions requested or ordered in this encounter       Imaging & Referrals:  None

## 2023-12-15 ENCOUNTER — TELEPHONE (OUTPATIENT)
Dept: FAMILY MEDICINE CLINIC | Facility: CLINIC | Age: 53
End: 2023-12-15

## 2023-12-18 RX ORDER — ZOLPIDEM TARTRATE 10 MG/1
10 TABLET ORAL NIGHTLY
Qty: 90 TABLET | Refills: 0 | Status: SHIPPED | OUTPATIENT
Start: 2023-12-18

## 2023-12-18 NOTE — TELEPHONE ENCOUNTER
Message noted: Chart reviewed and may refill medication as requested. Script sent to listed pharmacy by secure method.     Pt notified through [facility]

## 2023-12-19 ENCOUNTER — TELEPHONE (OUTPATIENT)
Dept: FAMILY MEDICINE CLINIC | Facility: CLINIC | Age: 53
End: 2023-12-19

## 2023-12-19 NOTE — TELEPHONE ENCOUNTER
losed   12/19/2023  3:03 PM  Case ID: [id number] Close reason: Prescription within prescribing limits. Prior Authorization not required. Note from payer: No PA required, Prescription is within prescribing limits.    Payer: [address] [place] [address]    [phone]   Sent patient [employer] message

## 2023-12-28 ENCOUNTER — OFFICE VISIT (OUTPATIENT)
Dept: FAMILY MEDICINE CLINIC | Facility: CLINIC | Age: 53
End: 2023-12-28

## 2023-12-28 VITALS
RESPIRATION RATE: 14 BRPM | BODY MASS INDEX: 26.22 KG/M2 | HEART RATE: 64 BPM | SYSTOLIC BLOOD PRESSURE: 104 MMHG | DIASTOLIC BLOOD PRESSURE: 69 MMHG | HEIGHT: 70 IN | WEIGHT: 183.19 LBS | OXYGEN SATURATION: 88 %

## 2023-12-28 DIAGNOSIS — M25.511 ACUTE PAIN OF RIGHT SHOULDER: Primary | ICD-10-CM

## 2023-12-28 PROCEDURE — 3078F DIAST BP <80 MM HG: CPT | Performed by: FAMILY MEDICINE

## 2023-12-28 PROCEDURE — 3008F BODY MASS INDEX DOCD: CPT | Performed by: FAMILY MEDICINE

## 2023-12-28 PROCEDURE — 3074F SYST BP LT 130 MM HG: CPT | Performed by: FAMILY MEDICINE

## 2023-12-28 PROCEDURE — 99213 OFFICE O/P EST LOW 20 MIN: CPT | Performed by: FAMILY MEDICINE

## 2023-12-28 NOTE — PROGRESS NOTES
Subjective:   Patient ID: Aissatou Lopez is a 47year old male. Pt presents for follow up for right shoulder pain. Pt originally injured the shoulder over a year ago and aggravated the shoulder again 3 months ago. No other problems. History/Other:   Review of Systems  Current Outpatient Medications   Medication Sig Dispense Refill    ZOLPIDEM 10 MG Oral Tab TAKE 1 TABLET(10 MG) BY MOUTH EVERY NIGHT 90 tablet 0    Omeprazole 40 MG Oral Capsule Delayed Release Take 1 capsule (40 mg total) by mouth daily. (Patient not taking: Reported on 3/9/2023) 30 capsule 2     Allergies:No Known Allergies    Objective:   Physical Exam  Constitutional:       Appearance: Normal appearance. Musculoskeletal:      Comments: Left shoulder; ROM intact. Non-tender. Some pain with movement. No swelling. Neurological:      Mental Status: He is alert. Assessment & Plan:   1. Acute pain of right shoulder :   - After discussion with patient, will check xray of right shoulder. Follow up and further management after testing. Consider follow up with specialist pending results. No orders of the defined types were placed in this encounter.       Meds This Visit:  Requested Prescriptions      No prescriptions requested or ordered in this encounter       Imaging & Referrals:  PHYSIATRY - INTERNAL  XR SHOULDER, COMPLETE (MIN 2 VIEWS), RIGHT (CPT=73030)

## 2024-01-14 NOTE — TELEPHONE ENCOUNTER
Message noted.
Order sent
Pt stated his Job is asking for another Covid test to return on Monday , last week was negative
Spoke with pt,  verified  Pt was informed of  MD recommendation, pt stated understanding. Pt provided with central scheduling for lab appointment, [phone].
No

## 2024-01-29 ENCOUNTER — OFFICE VISIT (OUTPATIENT)
Dept: FAMILY MEDICINE CLINIC | Facility: CLINIC | Age: 54
End: 2024-01-29

## 2024-01-29 VITALS
DIASTOLIC BLOOD PRESSURE: 78 MMHG | OXYGEN SATURATION: 98 % | BODY MASS INDEX: 26.46 KG/M2 | WEIGHT: 184.81 LBS | HEART RATE: 56 BPM | SYSTOLIC BLOOD PRESSURE: 116 MMHG | RESPIRATION RATE: 16 BRPM | HEIGHT: 70 IN
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
## 2024-01-29 DIAGNOSIS — S69.92XA INJURY OF LEFT HAND, INITIAL ENCOUNTER: ICD-10-CM

## 2024-01-29 NOTE — PATIENT INSTRUCTIONS
Ibuprofen 400-600 mg every 6-8 hours as needed for pain   
For information on Fall & Injury Prevention, visit [email or web address]

## 2024-01-29 NOTE — PROGRESS NOTES
HPI:    Patient ID: Kayden Gomez is a 53 year old male.    HPI     Patient here in office with complaint of left hand pain s/p fall on Tuesday.  Pain rated 7/10 and aggravated with movement.  Has tried Tylenol and ice with mild improvement.  Requesting letter to return to work, missed 1/26/2024 - 1/27/2024.  Reports he is predominantly right-handed.  Works as a .      Review of Systems   Constitutional: Negative.    Respiratory: Negative.     Cardiovascular: Negative.    Musculoskeletal:         Left hand pain   Skin: Negative.    Neurological: Negative.    Psychiatric/Behavioral: Negative.              Current Outpatient Medications   Medication Sig Dispense Refill    ZOLPIDEM 10 MG Oral Tab TAKE 1 TABLET(10 MG) BY MOUTH EVERY NIGHT 90 tablet 0     Allergies:No Known Allergies   /78   Pulse 56   Resp 16   Ht 5' 10\" (1.778 m)   Wt 184 lb 12.8 oz (83.8 kg)   SpO2 98%   BMI 26.52 kg/m²   Body mass index is 26.52 kg/m².  PHYSICAL EXAM:   Physical Exam  Vitals reviewed.   Constitutional:       General: He is not in acute distress.     Appearance: Normal appearance. He is not ill-appearing.   Cardiovascular:      Rate and Rhythm: Normal rate and regular rhythm.      Heart sounds: Normal heart sounds. No murmur heard.     No friction rub. No gallop.   Pulmonary:      Effort: Pulmonary effort is normal. No respiratory distress.      Breath sounds: Normal breath sounds. No stridor.   Musculoskeletal:         General: Tenderness and signs of injury present.      Comments: Left fifth metacarpal tenderness, no ecchymosis/swelling   Skin:     General: Skin is warm.      Findings: No rash.   Neurological:      General: No focal deficit present.      Mental Status: He is alert and oriented to person, place, and time.   Psychiatric:         Mood and Affect: Mood normal.         Behavior: Behavior normal.         Thought Content: Thought content normal.         Judgment: Judgment normal.                 ASSESSMENT/PLAN:   1. Fall due to slipping on ice or snow, initial encounter  -Work letter given per patient request  - Continue Tylenol/ice as needed  - X-ray ordered as listed below, will await results  - XR HAND (MIN 3 VIEWS), LEFT (CPT=73130); Future    2. Injury of left hand, initial encounter  -See above  - XR HAND (MIN 3 VIEWS), LEFT (CPT=73130); Future      No orders of the defined types were placed in this encounter.      Meds This Visit:  Requested Prescriptions      No prescriptions requested or ordered in this encounter       Imaging & Referrals:  XR HAND (MIN 3 VIEWS), LEFT (CPT=73130)         ID#[id number]

## 2024-01-30 ENCOUNTER — HOSPITAL ENCOUNTER (OUTPATIENT)
Dept: GENERAL RADIOLOGY | Age: 54
Discharge: HOME | End: 2024-01-30
Payer: COMMERCIAL

## 2024-01-30 ENCOUNTER — TELEPHONE (OUTPATIENT)
Dept: FAMILY MEDICINE CLINIC | Facility: CLINIC | Age: 54
End: 2024-01-30

## 2024-01-30 DIAGNOSIS — S69.92XA INJURY OF LEFT HAND, INITIAL ENCOUNTER: ICD-10-CM
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
## 2024-01-30 PROCEDURE — 73130 X-RAY EXAM OF HAND: CPT

## 2024-01-30 NOTE — TELEPHONE ENCOUNTER
Patient requesting an additional note to be off work per his appointment on 1/29/2024.  His employer is requesting a list of restrictions if any, and the note also needs to say he is clear to return to work, please call when ready for .

## 2024-03-04 NOTE — TELEPHONE ENCOUNTER
Controlled medication pending for review. Please change to phone in, fax, or print script if not being sent electronically.     Last Rx: 9-26-19 # 90  LOV: 12-9-19    Requested Prescriptions     Pending Prescriptions Disp Refills   • ZOLPIDEM TARTRATE 10 We will contact you with test results when available later today  Saltwater gargling, Cepacol lozenges, honey can help with sore throat  Tylenol as needed  Follow-up with primary care doctor as needed

## 2024-03-16 DIAGNOSIS — G47.00 INSOMNIA, UNSPECIFIED TYPE: Primary | ICD-10-CM

## 2024-03-18 RX ORDER — ZOLPIDEM TARTRATE 10 MG/1
10 TABLET ORAL NIGHTLY
Qty: 90 TABLET | Refills: 0 | Status: SHIPPED | OUTPATIENT
Start: 2024-03-18

## 2024-03-18 NOTE — TELEPHONE ENCOUNTER
Message noted: Chart reviewed and may refill medication as requested. Script sent to listed pharmacy by secure method.    Pt notified through [employer]

## 2024-03-18 NOTE — TELEPHONE ENCOUNTER
Please review. Protocol failed or has no protocol.    Requested Prescriptions   Pending Prescriptions Disp Refills    ZOLPIDEM 10 MG Oral Tab [Pharmacy Med Name: ZOLPIDEM 10MG TABLETS] 90 tablet 0     Sig: TAKE 1 TABLET(10 MG) BY MOUTH EVERY NIGHT       Controlled Substance Medication Failed - 3/16/2024  7:01 AM        Failed - This medication is a controlled substance - forward to provider to refill             Recent Outpatient Visits              1 month ago Fall due to slipping on ice or snow, initial encounter    [facility] Marcelle Celaya Jennifer, APRN    Office Visit    2 months ago Acute pain of right shoulder    [facility]iel[facility]    Office Visit    5 months ago Strain of right shoulder, initial encounter    [facility]    Office Visit    1 year ago Neoplasm of unspecified behavior of bone, soft tissue, and skin    [facility] Marcelle Celaya Daniel, MD    Office Visit    1 year ago Right foot pain    [facility]    Office Visit

## 2024-06-13 DIAGNOSIS — G47.00 INSOMNIA, UNSPECIFIED TYPE: ICD-10-CM

## 2024-06-15 NOTE — TELEPHONE ENCOUNTER
Patient calling to check on status of refill request below.    Patient is completely out of the medication at this time.    Preferred pharmacy is [facility] in [place] on [address]    Expedite this request if able to, patient has a hard time sleeping without this medication.

## 2024-06-15 NOTE — TELEPHONE ENCOUNTER
Recent Fills: 03/18/2024, 04/17/2024, 05/15/2024    Last Rx Written: 03/18/2024    Last Office Visit: 01/29/2024

## 2024-06-17 RX ORDER — ZOLPIDEM TARTRATE 10 MG/1
10 TABLET ORAL NIGHTLY
Qty: 90 TABLET | Refills: 0 | Status: SHIPPED | OUTPATIENT
Start: 2024-06-17

## 2024-06-17 NOTE — TELEPHONE ENCOUNTER
Message noted: Chart reviewed and may refill medication as requested. Script sent to listed pharmacy by secure method.    Pt notified through [employer]

## 2024-07-08 ENCOUNTER — OFFICE VISIT (OUTPATIENT)
Dept: FAMILY MEDICINE CLINIC | Facility: CLINIC | Age: 54
End: 2024-07-08
Payer: COMMERCIAL

## 2024-07-08 VITALS
DIASTOLIC BLOOD PRESSURE: 70 MMHG | WEIGHT: 185 LBS | BODY MASS INDEX: 26.48 KG/M2 | HEIGHT: 70 IN | HEART RATE: 72 BPM | TEMPERATURE: 98 F | SYSTOLIC BLOOD PRESSURE: 105 MMHG | RESPIRATION RATE: 18 BRPM

## 2024-07-08 DIAGNOSIS — M26.622 ARTHRALGIA OF LEFT TEMPOROMANDIBULAR JOINT: Primary | ICD-10-CM

## 2024-07-08 PROCEDURE — 3008F BODY MASS INDEX DOCD: CPT | Performed by: FAMILY MEDICINE

## 2024-07-08 PROCEDURE — 99213 OFFICE O/P EST LOW 20 MIN: CPT | Performed by: FAMILY MEDICINE

## 2024-07-08 PROCEDURE — 3078F DIAST BP <80 MM HG: CPT | Performed by: FAMILY MEDICINE

## 2024-07-08 PROCEDURE — 3074F SYST BP LT 130 MM HG: CPT | Performed by: FAMILY MEDICINE

## 2024-07-08 NOTE — PROGRESS NOTES
Subjective:   Patient ID: Kayden Gomez is a 54 year old male.    Pt presents with pain of the left jaw area over the lat 1-2 week. Pt may have chewed something too hard and also bumped the side of face on a chair at work. Pain with chewing/ yawning. No sig bruising or swelling of face.  No fevers or drainage of ear. No bleeding or swelling. Pt does grind his teeth at times. Has not been able to tolerate mouth guard.        History/Other:   Review of Systems   Constitutional:  Negative for fever.   Musculoskeletal:  Positive for arthralgias (left side of jaw).     Current Outpatient Medications   Medication Sig Dispense Refill    ZOLPIDEM 10 MG Oral Tab TAKE 1 TABLET(10 MG) BY MOUTH EVERY NIGHT 90 tablet 0     Allergies:No Known Allergies    Objective:   Physical Exam  HENT:      Head:      Comments: TMJ area; non-tender. No sig swelling or redness of both sides. Some mild discomfort with opening and closing mouth. No clicking noted. No locking.      Right Ear: Tympanic membrane and ear canal normal.      Left Ear: Tympanic membrane and ear canal normal.      Mouth/Throat:      Mouth: Mucous membranes are moist.      Pharynx: No posterior oropharyngeal erythema.   Musculoskeletal:      Cervical back: Normal range of motion.   Lymphadenopathy:      Cervical: No cervical adenopathy.         Assessment & Plan:   1. Arthralgia of left temporomandibular joint: Exam unremarkable  - After discussion with patient, can check xray of TMJ area. Medications as needed for symptoms. Follow up and further management after testing. Consider follow up with specialist ... if not better  Note for work provided           No orders of the defined types were placed in this encounter.      Meds This Visit:  Requested Prescriptions      No prescriptions requested or ordered in this encounter       Imaging & Referrals:  ENT - INTERNAL  XR TMJ- TM JOINT BILATERAL  (CPT=70330)

## 2024-07-09 ENCOUNTER — OFFICE VISIT (OUTPATIENT)
Dept: OTOLARYNGOLOGY | Facility: CLINIC | Age: 54
End: 2024-07-09
Payer: COMMERCIAL

## 2024-07-09 ENCOUNTER — HOSPITAL ENCOUNTER (OUTPATIENT)
Dept: GENERAL RADIOLOGY | Age: 54
Discharge: HOME | End: 2024-07-09
Attending: FAMILY MEDICINE
Payer: COMMERCIAL

## 2024-07-09 DIAGNOSIS — M26.609 TMJ (TEMPOROMANDIBULAR JOINT DISORDER): Primary | ICD-10-CM

## 2024-07-09 DIAGNOSIS — H92.02 LEFT EAR PAIN: ICD-10-CM

## 2024-07-09 DIAGNOSIS — M26.622 ARTHRALGIA OF LEFT TEMPOROMANDIBULAR JOINT: ICD-10-CM

## 2024-07-09 PROCEDURE — 99204 OFFICE O/P NEW MOD 45 MIN: CPT | Performed by: STUDENT IN AN ORGANIZED HEALTH CARE EDUCATION/TRAINING PROGRAM

## 2024-07-09 PROCEDURE — 70330 X-RAY EXAM OF JAW JOINTS: CPT | Performed by: FAMILY MEDICINE

## 2024-07-09 RX ORDER — CELECOXIB 200 MG/1
200 CAPSULE ORAL 2 TIMES DAILY
Qty: 28 CAPSULE | Refills: 0 | Status: SHIPPED | OUTPATIENT
Start: 2024-07-09 | End: 2024-07-23

## 2024-07-09 RX ORDER — CYCLOBENZAPRINE HCL 5 MG
5 TABLET ORAL NIGHTLY
Qty: 30 TABLET | Refills: 0 | Status: SHIPPED | OUTPATIENT
Start: 2024-07-09 | End: 2024-08-08

## 2024-07-09 NOTE — PROGRESS NOTES
Kayden Gomez is a 54 year old male.   Chief Complaint   Patient presents with    Tmj Disorder     Left Jaw pain     HPI:   54-year-old presents with left jaw pain and difficulty eating he states that he has had a couple injuries to his left temporomandibular joint in the past.    Current Outpatient Medications   Medication Sig Dispense Refill    cyclobenzaprine 5 MG Oral Tab Take 1 tablet (5 mg total) by mouth nightly. 30 tablet 0    celecoxib 200 MG Oral Cap Take 1 capsule (200 mg total) by mouth 2 (two) times daily for 14 days. 28 capsule 0    ZOLPIDEM 10 MG Oral Tab TAKE 1 TABLET(10 MG) BY MOUTH EVERY NIGHT 90 tablet 0      Past Medical History:    Anxiety      Social History:  Social History     Socioeconomic History    Marital status: Single   Tobacco Use    Smoking status: Never    Smokeless tobacco: Never   Vaping Use    Vaping status: Never Used   Substance and Sexual Activity    Alcohol use: Yes     Comment: occasionally    Drug use: No   Other Topics Concern    Caffeine Concern Yes     Comment: soda, coffee    Pt has a pacemaker No    Pt has a defibrillator No    Reaction to local anesthetic No      Past Surgical History:   Procedure Laterality Date    Other surgical history Right     hand    Other surgical history      dental implant         EXAM:   There were no vitals taken for this visit.    System Details   Skin Inspection - Normal.   Constitutional Overall appearance - Normal.   Head/Face Symmetric, TMJ tenderness not present    Eyes EOMI, PERRL   Right ear:  Canal clear, TM intact, no ERICKA   Left ear:  Canal clear, TM intact, no ERICKA   Nose: Septum midline, inferior turbinates not enlarged, nasal valves without collapse    Oral cavity/Oropharynx: No lesions or masses on inspection or palpation, tonsils symmetric   Mild trismus.  Tenderness in the left temporomandibular joint   Neck: Soft without LAD, thyroid not enlarged  Voice clear/ no stridor   Other:      SCOPES AND PROCEDURES:          AUDIOGRAM AND IMAGING:         IMPRESSION:   1. TMJ (temporomandibular joint disorder)    2. Left ear pain       Recommendations:  -Appears he has tenderness of the left temporal mandibular joint in the setting of previous trauma to the area  -Will prescribe a muscle relaxant and anti-inflammatory and discussed the use and possible side effects of these medications  -Will follow-up on the x-ray results of his TMJs  -Discussed meeting with an oral surgeon as it appears she has a more advanced process regarding his TMJs in the setting of previous trauma  -Could consider physical therapy as well for TMJ disorder    The patient indicates understanding of these issues and agrees to the plan.  Consult from Dr Levin regarding TMD    Rubio Lorenzo MD  7/9/2024  5:27 PM

## 2024-08-07 ENCOUNTER — OFFICE VISIT (OUTPATIENT)
Dept: FAMILY MEDICINE CLINIC | Facility: CLINIC | Age: 54
End: 2024-08-07

## 2024-08-07 VITALS
SYSTOLIC BLOOD PRESSURE: 120 MMHG | DIASTOLIC BLOOD PRESSURE: 75 MMHG | BODY MASS INDEX: 26.05 KG/M2 | HEART RATE: 67 BPM | WEIGHT: 182 LBS | HEIGHT: 70 IN | TEMPERATURE: 98 F

## 2024-08-07 DIAGNOSIS — R51.9 ACUTE INTRACTABLE HEADACHE, UNSPECIFIED HEADACHE TYPE: Primary | ICD-10-CM

## 2024-08-07 PROCEDURE — 3008F BODY MASS INDEX DOCD: CPT | Performed by: NURSE PRACTITIONER

## 2024-08-07 PROCEDURE — 3074F SYST BP LT 130 MM HG: CPT | Performed by: NURSE PRACTITIONER

## 2024-08-07 PROCEDURE — 99213 OFFICE O/P EST LOW 20 MIN: CPT | Performed by: NURSE PRACTITIONER

## 2024-08-07 PROCEDURE — 3078F DIAST BP <80 MM HG: CPT | Performed by: NURSE PRACTITIONER

## 2024-08-07 NOTE — PROGRESS NOTES
HPI    Patient presents for concerns of fever and headaches that presented this past weekend.  Headaches have subsided.      Review of Systems   Constitutional:  Positive for fever.   Neurological:  Positive for headaches.        Vitals:    08/07/24 1423   BP: 120/75   Pulse: 67   Temp: 98.2 °F (36.8 °C)   Weight: 182 lb (82.6 kg)   Height: 5' 10\" (1.778 m)     Body mass index is 26.11 kg/m².    Health Maintenance   Topic Date Due    Annual Physical  Never done    Colorectal Cancer Screening  Never done    DTaP,Tdap,and Td Vaccines (1 - Tdap) Never done    PSA  Never done    Zoster Vaccines (1 of 2) Never done    COVID-19 Vaccine (3 - 2023-24 season) 09/01/2023    Influenza Vaccine (1) 10/01/2024    Annual Depression Screening  Completed    Pneumococcal Vaccine: Birth to 64yrs  Aged Out       Past Medical History:    Anxiety       .  Past Surgical History:   Procedure Laterality Date    Other surgical history Right     hand    Other surgical history      dental implant       History reviewed. No pertinent family history.    Social History     Socioeconomic History    Marital status: Single     Spouse name: Not on file    Number of children: Not on file    Years of education: Not on file    Highest education level: Not on file   Occupational History    Not on file   Tobacco Use    Smoking status: Never    Smokeless tobacco: Never   Vaping Use    Vaping status: Never Used   Substance and Sexual Activity    Alcohol use: Yes     Comment: occasionally    Drug use: No    Sexual activity: Not on file   Other Topics Concern     Service Not Asked    Blood Transfusions Not Asked    Caffeine Concern Yes     Comment: soda, coffee    Occupational Exposure Not Asked    Hobby Hazards Not Asked    Sleep Concern Not Asked    Stress Concern Not Asked    Weight Concern Not Asked    Special Diet Not Asked    Back Care Not Asked    Exercise Not Asked    Bike Helmet Not Asked    Seat Belt Not Asked    Self-Exams Not Asked    Grew  up on a farm Not Asked    History of tanning Not Asked    Outdoor occupation Not Asked    Pt has a pacemaker No    Pt has a defibrillator No    Reaction to local anesthetic No   Social History Narrative    Not on file     Social Determinants of Health     Financial Resource Strain: Not on file   Food Insecurity: Not on file   Transportation Needs: Not on file   Physical Activity: Not on file   Stress: Not on file   Social Connections: Not on file   Housing Stability: Not on file       Current Outpatient Medications   Medication Sig Dispense Refill    ZOLPIDEM 10 MG Oral Tab TAKE 1 TABLET(10 MG) BY MOUTH EVERY NIGHT 90 tablet 0       Allergies:  No Known Allergies    Physical Exam  Vitals and nursing note reviewed.   Constitutional:       General: He is not in acute distress.     Appearance: Normal appearance. He is not ill-appearing.   HENT:      Head: Normocephalic and atraumatic.   Cardiovascular:      Rate and Rhythm: Normal rate and regular rhythm.      Heart sounds: Normal heart sounds. No murmur heard.  Pulmonary:      Effort: Pulmonary effort is normal. No respiratory distress.      Breath sounds: Normal breath sounds. No stridor. No wheezing, rhonchi or rales.   Chest:      Chest wall: No tenderness.   Neurological:      Mental Status: He is alert and oriented to person, place, and time.          Assessment and Plan:   Problem List Items Addressed This Visit    None  Visit Diagnoses       Acute intractable headache, unspecified headache type    -  Primary           Supportive care discussed.      Discussed plan of care with patient and patient is in agreement.  All questions answered. Patient to call with questions or concerns.    Encouraged to sign up for My Chart if not already registered.

## 2024-09-12 DIAGNOSIS — G47.00 INSOMNIA, UNSPECIFIED TYPE: ICD-10-CM

## 2024-09-12 RX ORDER — ZOLPIDEM TARTRATE 10 MG/1
10 TABLET ORAL NIGHTLY
Qty: 90 TABLET | Refills: 0 | Status: SHIPPED | OUTPATIENT
Start: 2024-09-12

## 2024-09-12 NOTE — TELEPHONE ENCOUNTER
Please review; protocol failed    No future appointments.  LAST OFFICE VISIT: 8/7/2024    Recent fill dates: 8/14/24, 7/16/24, and 6/17/24     For qty of: #30 each for a 30 day supply  Date of  last written prescription:      Date: 6/17/24       Requested Prescriptions   Pending Prescriptions Disp Refills    ZOLPIDEM 10 MG Oral Tab [Pharmacy Med Name: ZOLPIDEM 10MG TABLETS] 90 tablet 0     Sig: TAKE 1 TABLET(10 MG) BY MOUTH EVERY NIGHT       Controlled Substance Medication Failed - 9/12/2024  6:17 AM        Failed - This medication is a controlled substance - forward to provider to refill               Recent Outpatient Visits              1 month ago Acute intractable headache, unspecified headache type    [facility] [address], Alexa Carmona APRN    Office Visit    2 months ago TMJ (temporomandibular joint disorder)    [facility], Rubio Peters MD    Office Visit    2 months ago Arthralgia of left temporomandibular joint    [facility]    [facility]    7 months ago [facility] due to slipping on ice or snow[facility]    [facility]    8 months ago Acute pain of right shoulder    [facility]    [facility] Visit

## 2024-09-12 NOTE — TELEPHONE ENCOUNTER
Message noted: Chart reviewed and may refill medication as requested. Script sent to listed pharmacy by secure method.    Pt notified through [employer]

## 2024-10-18 ENCOUNTER — OFFICE VISIT (OUTPATIENT)
Dept: FAMILY MEDICINE CLINIC | Facility: CLINIC | Age: 54
End: 2024-10-18

## 2024-10-18 VITALS
SYSTOLIC BLOOD PRESSURE: 128 MMHG | HEART RATE: 61 BPM | WEIGHT: 179.5 LBS | DIASTOLIC BLOOD PRESSURE: 81 MMHG | BODY MASS INDEX: 26 KG/M2

## 2024-10-18 DIAGNOSIS — M54.50 ACUTE MIDLINE LOW BACK PAIN WITHOUT SCIATICA: ICD-10-CM

## 2024-10-18 DIAGNOSIS — R05.1 ACUTE COUGH: Primary | ICD-10-CM

## 2024-10-18 DIAGNOSIS — M77.02 MEDIAL EPICONDYLITIS OF LEFT ELBOW: ICD-10-CM

## 2024-10-18 PROCEDURE — 99214 OFFICE O/P EST MOD 30 MIN: CPT | Performed by: NURSE PRACTITIONER

## 2024-10-18 PROCEDURE — 3079F DIAST BP 80-89 MM HG: CPT | Performed by: NURSE PRACTITIONER

## 2024-10-18 PROCEDURE — 3074F SYST BP LT 130 MM HG: CPT | Performed by: NURSE PRACTITIONER

## 2024-10-18 RX ORDER — CYCLOBENZAPRINE HCL 5 MG
5 TABLET ORAL NIGHTLY PRN
Qty: 30 TABLET | Refills: 0 | Status: SHIPPED | OUTPATIENT
Start: 2024-10-18

## 2024-10-18 RX ORDER — NAPROXEN 500 MG/1
500 TABLET ORAL 2 TIMES DAILY PRN
Qty: 60 TABLET | Refills: 0 | Status: SHIPPED | OUTPATIENT
Start: 2024-10-18

## 2024-10-18 RX ORDER — BENZONATATE 100 MG/1
100 CAPSULE ORAL 3 TIMES DAILY PRN
Qty: 30 CAPSULE | Refills: 0 | Status: SHIPPED | OUTPATIENT
Start: 2024-10-18

## 2024-10-18 NOTE — PROGRESS NOTES
HPI    Patient presents for concerns of dry cough for the past few weeks.  Also with concerns of low back and left elbow pain.  Has tried tylenol with minimal pain.  Works as a .  Pain is starting to become more severe with time.     Review of Systems   Constitutional:  Negative for fever.   HENT:  Negative for congestion.    Respiratory:  Positive for cough.    Musculoskeletal:  Positive for back pain.        Left elbow   Neurological:  Positive for headaches.        Vitals:    10/18/24 1353   BP: 128/81   Pulse: 61   Weight: 179 lb 8 oz (81.4 kg)     Body mass index is 25.76 kg/m².    Health Maintenance   Topic Date Due    Annual Physical  Never done    Colorectal Cancer Screening  Never done    DTaP,Tdap,and Td Vaccines (1 - Tdap) Never done    PSA  Never done    Zoster Vaccines (1 of 2) Never done    COVID-19 Vaccine (3 - 2023-24 season) 09/01/2024    Influenza Vaccine (1) Never done    Annual Depression Screening  Completed    Pneumococcal Vaccine: Birth to 64yrs  Aged Out       Past Medical History:    Anxiety       .  Past Surgical History:   Procedure Laterality Date    Other surgical history Right     hand    Other surgical history      dental implant       History reviewed. No pertinent family history.    Social History     Socioeconomic History    Marital status: Single     Spouse name: Not on file    Number of children: Not on file    Years of education: Not on file    Highest education level: Not on file   Occupational History    Not on file   Tobacco Use    Smoking status: Never    Smokeless tobacco: Never   Vaping Use    Vaping status: Never Used   Substance and Sexual Activity    Alcohol use: Yes     Comment: occasionally    Drug use: No    Sexual activity: Not on file   Other Topics Concern     Service Not Asked    Blood Transfusions Not Asked    Caffeine Concern Yes     Comment: soda, coffee    Occupational Exposure Not Asked    Hobby Hazards Not Asked    Sleep Concern Not Asked     Stress Concern Not Asked    Weight Concern Not Asked    Special Diet Not Asked    Back Care Not Asked    Exercise Not Asked    Bike Helmet Not Asked    Seat Belt Not Asked    Self-Exams Not Asked    Grew up on a farm Not Asked    History of tanning Not Asked    Outdoor occupation Not Asked    Pt has a pacemaker No    Pt has a defibrillator No    Reaction to local anesthetic No   Social History Narrative    Not on file     Social Drivers of Health     Financial Resource Strain: Not on file   Food Insecurity: Not on file   Transportation Needs: Not on file   Physical Activity: Not on file   Stress: Not on file   Social Connections: Not on file   Housing Stability: Not on file       Current Outpatient Medications   Medication Sig Dispense Refill    benzonatate (TESSALON PERLES) 100 MG Oral Cap Take 1 capsule (100 mg total) by mouth 3 (three) times daily as needed for cough. 30 capsule 0    naproxen 500 MG Oral Tab Take 1 tablet (500 mg total) by mouth 2 (two) times daily as needed (take with food). 60 tablet 0    cyclobenzaprine 5 MG Oral Tab Take 1 tablet (5 mg total) by mouth nightly as needed for Muscle spasms. 30 tablet 0    zolpidem 10 MG Oral Tab Take 1 tablet (10 mg total) by mouth nightly. 90 tablet 0       Allergies:  Allergies[1]    Physical Exam  Vitals and nursing note reviewed.   Constitutional:       General: He is not in acute distress.     Appearance: Normal appearance.   HENT:      Head: Normocephalic.   Pulmonary:      Effort: Pulmonary effort is normal. No respiratory distress.      Breath sounds: Normal breath sounds. No stridor. No wheezing, rhonchi or rales.   Chest:      Chest wall: No tenderness.   Musculoskeletal:      Left elbow: Normal range of motion. Tenderness present in medial epicondyle.      Lumbar back: Spasms and tenderness present.   Neurological:      Mental Status: He is alert.          Assessment and Plan:   Problem List Items Addressed This Visit    None  Visit Diagnoses        Acute cough    -  Primary    Relevant Medications    benzonatate (TESSALON PERLES) 100 MG Oral Cap    Medial epicondylitis of left elbow        Relevant Medications    naproxen 500 MG Oral Tab    Acute midline low back pain without sciatica        Relevant Medications    naproxen 500 MG Oral Tab    cyclobenzaprine 5 MG Oral Tab           Benzonatate 3 times daily as needed.  Patient refused imaging of back and elbow today.  Naproxen twice daily as needed, Flexeril nightly as needed.  Supportive care discussed.  Follow-up for imaging if no relief of symptoms.  Supportive care discussed.    Discussed plan of care with patient and patient is in agreement.  All questions answered. Patient to call with questions or concerns.    Encouraged to sign up for My Chart if not already registered.        [1] No Known Allergies

## 2024-10-21 ENCOUNTER — TELEPHONE (OUTPATIENT)
Dept: FAMILY MEDICINE CLINIC | Facility: CLINIC | Age: 54
End: 2024-10-21

## 2024-10-21 NOTE — TELEPHONE ENCOUNTER
Patient would like a note to excuse from starting 10/17/24 and to return to work on 10/18/24. Patient is able to return with no resections. Patient would like to  note today. Please call when ready. Please advise

## 2024-10-22 NOTE — TELEPHONE ENCOUNTER
Left voice message informing patient his work note is at the the . He can pick it up at his earliest convenience.

## 2024-11-21 ENCOUNTER — APPOINTMENT (OUTPATIENT)
Dept: GENERAL RADIOLOGY | Age: 54
End: 2024-11-21
Attending: EMERGENCY MEDICINE
Payer: COMMERCIAL

## 2024-11-21 ENCOUNTER — HOSPITAL ENCOUNTER (OUTPATIENT)
Age: 54
Discharge: HOME | End: 2024-11-21
Attending: EMERGENCY MEDICINE
Payer: COMMERCIAL

## 2024-11-21 VITALS
TEMPERATURE: 98 F | RESPIRATION RATE: 20 BRPM | OXYGEN SATURATION: 93 % | SYSTOLIC BLOOD PRESSURE: 120 MMHG | DIASTOLIC BLOOD PRESSURE: 63 MMHG | HEART RATE: 72 BPM

## 2024-11-21 DIAGNOSIS — S61.216A LACERATION OF RIGHT LITTLE FINGER WITHOUT FOREIGN BODY WITHOUT DAMAGE TO NAIL, INITIAL ENCOUNTER: Primary | ICD-10-CM

## 2024-11-21 PROCEDURE — 12002 RPR S/N/AX/GEN/TRNK2.6-7.5CM: CPT

## 2024-11-21 PROCEDURE — 73140 X-RAY EXAM OF FINGER(S): CPT | Performed by: EMERGENCY MEDICINE

## 2024-11-21 PROCEDURE — 99204 OFFICE O/P NEW MOD 45 MIN: CPT

## 2024-11-21 PROCEDURE — 90471 IMMUNIZATION ADMIN: CPT

## 2024-11-21 PROCEDURE — 99213 OFFICE O/P EST LOW 20 MIN: CPT

## 2024-11-21 NOTE — DISCHARGE INSTRUCTIONS
Keep wound clean and dry.  Return for any redness or drainage.  Follow-up in 7 to 10 days for suture removal

## 2024-11-21 NOTE — ED PROVIDER NOTES
Patient Seen in: [facility]      History     Chief Complaint   Patient presents with    Laceration     Stated Complaint: Right hand laceration    Subjective:   HPI      The patient is a 54-year-old male with no significant past medical history who presents now with right fifth finger laceration.  The patient states a glass fell out of a cabinet.  Patient tried to catch the glass but the glass broke, lacerating his right fifth finger.  The patient denies any numbness or tingling.  The patient denies any decreased range of motion.  Patient states the glass broke into multiple small fragments and is unsure if there is any foreign body.    Objective:     No pertinent past medical history.            No pertinent past surgical history.              No pertinent social history.            Review of Systems    Positive for stated complaint: Right hand laceration  Other systems are as noted in HPI.  Constitutional and vital signs reviewed.      All other systems reviewed and negative except as noted above.    Physical Exam     ED Triage Vitals [11/21/24 0839]   /63   Pulse 72   Resp 20   Temp 98.2 °F (36.8 °C)   Temp src Temporal   SpO2 93 %   O2 Device None (Room air)       Current Vitals:   Vital Signs  BP: 120/63  Pulse: 72  Resp: 20  Temp: 98.2 °F (36.8 °C)  Temp src: Temporal    Oxygen Therapy  SpO2: 93 %  O2 Device: None (Room air)        Physical Exam    Constitutional: Well-developed well-nourished in no acute distress  Head: Normocephalic, no swelling or tenderness  Eyes: Nonicteric sclera, no conjunctival injection  Vascular: Palpable right radial pulse with good capillary refill and all fingers  Neurologic: Patient is awake, alert and oriented ×3.  The patient's motor strength is 5 out of 5 and symmetric in the upper and lower extremities bilaterally  Extremities: No focal swelling or tenderness  Skin: There is a 4 cm oblique laceration across the palmar aspect of the right fifth digit which  extends into the subcutaneous fat.  There is no palpable foreign body      ED Course   Labs Reviewed - No data to display  Patient's x-ray was independently reviewed by myself.  There is no foreign body seen.  There is no bony abnormality.  Laceration/soft tissue injury is apparent moderate subcutaneous emphysema       Laceration repair:     Verbal consent was obtained from the patient. The 4 cm laceration on the right fifth finger was anesthetized in the usual fashion. The wound was scrubbed, draped and explored to its base with a gloved finger. There were no deep structures involved. No tendon injury was identified. The wound was repaired with 450 simple interrupted nylon sutures. The wound repair was simple. The procedure was performed by myself.    Malvin Elizabeth MD  9:18 AM  11/21/24         MDM      Laceration versus laceration with foreign body        Medical Decision Making      Disposition and Plan     Clinical Impression:  1. Laceration of right little finger without foreign body without damage to nail, initial encounter         Disposition:  Discharge  11/21/2024  9:19 am    Follow-up:  [facility] [place] [address] [place] [address]  [phone]    In 7 to 10 days for suture removal          Medications Prescribed:  Discharge Medication List as of 11/21/2024  9:23 AM              Supplementary Documentation:

## 2024-11-21 NOTE — ED INITIAL ASSESSMENT (HPI)
Patient with right pinky laceration after a glass fell from his cabinet this morning resulting in laceration     Scant bleeding noted

## 2024-11-29 ENCOUNTER — HOSPITAL ENCOUNTER (OUTPATIENT)
Age: 54
Discharge: HOME | End: 2024-11-29
Attending: STUDENT IN AN ORGANIZED HEALTH CARE EDUCATION/TRAINING PROGRAM
Payer: COMMERCIAL

## 2024-11-29 VITALS
DIASTOLIC BLOOD PRESSURE: 73 MMHG | HEART RATE: 63 BPM | OXYGEN SATURATION: 98 % | SYSTOLIC BLOOD PRESSURE: 134 MMHG | RESPIRATION RATE: 18 BRPM | TEMPERATURE: 97 F

## 2024-11-29 DIAGNOSIS — Z48.02 ENCOUNTER FOR REMOVAL OF SUTURES: Primary | ICD-10-CM

## 2024-11-29 NOTE — ED PROVIDER NOTES
Patient Seen in: [facility]      History     Chief Complaint   Patient presents with    Sut Stap RingRemoval     Stated Complaint: stitch removal    Subjective:   HPI      53-year-old male who presents for suture removal of 4 sutures from the right fifth distal finger.  Per chart review and confirmed by the patient, on 11/21/2024, 4 sutures were placed following a laceration to the right fourth distal finger when glass fell out of a cabinet and he tried to catch it.  Since this time, he denies any signs of infection with no spreading redness, no purulence, no fevers, no pain.  He works as a  so has kept the site covered to prevent secondary bacterial infection.  Per chart review, it does appear that the patient's tetanus shot was updated and bacitracin was prescribed.    Objective:     Past Medical History:    Anxiety              Past Surgical History:   Procedure Laterality Date    Other surgical history Right     hand    Other surgical history      dental implant                Social History     Socioeconomic History    Marital status: Single   Tobacco Use    Smoking status: Never    Smokeless tobacco: Never   Vaping Use    Vaping status: Never Used   Substance and Sexual Activity    Alcohol use: Yes     Comment: occasionally    Drug use: No   Other Topics Concern    Caffeine Concern Yes     Comment: soda, coffee    Pt has a pacemaker No    Pt has a defibrillator No    Reaction to local anesthetic No              Review of Systems    Positive for stated complaint: stitch removal  Other systems are as noted in HPI.  Constitutional and vital signs reviewed.      All other systems reviewed and negative except as noted above.    Physical Exam     ED Triage Vitals [11/29/24 1100]   /73   Pulse 63   Resp 18   Temp 97.2 °F (36.2 °C)   Temp src Oral   SpO2 98 %   O2 Device None (Room air)       Current Vitals:   Vital Signs  BP: 134/73  Pulse: 63  Resp: 18  Temp: 97.2 °F (36.2 °C)  Temp src:  Oral    Oxygen Therapy  SpO2: 98 %  O2 Device: None (Room air)        Physical Exam    General: Awake, alert, comfortable on room air, in no distress, tolerating oral secretions, interactive  Pulmonary: No conversational dyspnea  Neuro: Symmetrical facial expressions on gross observation  Musculoskeletal: Intact active flexion and extension of the right fifth MCP/PIP/DIP joint  HEENT: No periorbital edema or erythema  Skin: 4 Ethilon sutures are intact at the right volar distal fourth finger, no surrounding erythema, no purulent drainage, no sign of dehiscence, no bleeding, no fluctuance, no warmth  Psych: Normal mood, normal affect    ED Course   No labs or imaging performed    MDM   Sutures appear ready for removal with no appreciated dehiscence on palpation, no sign of secondary bacterial infection with no sign of cellulitis, no underlying fluctuance to suggest an abscess, intact range of motion of the left fourth finger with no sign of ligament or tendon injury  -With suture removal scissors, all 4 sutures were removed, no immediate complications, no bleeding, no dehiscence, finger visualized through all range of motion with no sign of dehiscence  -Patient works as a , we discussed that he can keep a Band-Aid on while working to help prevent contamination, but should otherwise leave the site open to air so as to prevent moisture accumulation and maceration/suffocation of site    Disposition and Plan     Clinical Impression:  1. Encounter for removal of sutures         Disposition:  Discharge  11/29/2024 11:51 am    Follow-up:    Primary care physician as needed    Medications Prescribed:  Discharge Medication List as of 11/29/2024 11:54 AM            None

## 2024-11-29 NOTE — DISCHARGE INSTRUCTIONS
All four sutures were removed with no complications and currently there are no signs of infection.  As we discussed, if you have any signs of infection such as redness, purulent drainage, pain, or fevers, you should present immediately for reassessment.

## 2024-12-11 DIAGNOSIS — G47.00 INSOMNIA, UNSPECIFIED TYPE: ICD-10-CM

## 2024-12-12 RX ORDER — ZOLPIDEM TARTRATE 10 MG/1
10 TABLET ORAL NIGHTLY
Qty: 90 TABLET | Refills: 0 | Status: SHIPPED | OUTPATIENT
Start: 2024-12-12

## 2024-12-12 NOTE — TELEPHONE ENCOUNTER
Message noted: Chart reviewed and may refill medication as requested. Script sent to listed pharmacy by secure method.    Pt notified through [employer]

## 2024-12-16 DIAGNOSIS — G47.00 INSOMNIA, UNSPECIFIED TYPE: ICD-10-CM

## 2024-12-16 RX ORDER — ZOLPIDEM TARTRATE 10 MG/1
10 TABLET ORAL NIGHTLY
Qty: 90 TABLET | Refills: 0 | Status: CANCELLED | OUTPATIENT
Start: 2024-12-16

## 2024-12-16 NOTE — TELEPHONE ENCOUNTER
Patient called regarding the following medication:    ZOLPIDEM 10 MG Oral Tab     Per patient his pharmacy did not receive the refill.

## 2024-12-17 NOTE — TELEPHONE ENCOUNTER
Dr. Levin - please send again.  [facility] does not have record of the prescription that was sent for #90 on 12/12/24.

## 2024-12-18 ENCOUNTER — TELEPHONE (OUTPATIENT)
Dept: FAMILY MEDICINE CLINIC | Facility: CLINIC | Age: 54
End: 2024-12-18

## 2024-12-18 RX ORDER — ZOLPIDEM TARTRATE 10 MG/1
10 TABLET ORAL NIGHTLY
Qty: 90 TABLET | Refills: 0 | Status: SHIPPED | OUTPATIENT
Start: 2024-12-18

## 2024-12-18 NOTE — TELEPHONE ENCOUNTER
KEY: [id number]          zolpidem 10 MG Oral Tab, Take 1 tablet (10 mg total) by mouth nightly., Disp: 90 tablet, Rfl: 0

## 2025-03-03 ENCOUNTER — OFFICE VISIT (OUTPATIENT)
Dept: FAMILY MEDICINE CLINIC | Facility: CLINIC | Age: 55
End: 2025-03-03

## 2025-03-03 VITALS
HEIGHT: 70 IN | SYSTOLIC BLOOD PRESSURE: 100 MMHG | TEMPERATURE: 99 F | DIASTOLIC BLOOD PRESSURE: 60 MMHG | HEART RATE: 72 BPM | WEIGHT: 185 LBS | BODY MASS INDEX: 26.48 KG/M2 | RESPIRATION RATE: 16 BRPM

## 2025-03-03 DIAGNOSIS — K21.9 GASTROESOPHAGEAL REFLUX DISEASE, UNSPECIFIED WHETHER ESOPHAGITIS PRESENT: ICD-10-CM

## 2025-03-03 DIAGNOSIS — M54.50 DORSALGIA OF LUMBAR REGION: ICD-10-CM

## 2025-03-03 PROCEDURE — 99213 OFFICE O/P EST LOW 20 MIN: CPT | Performed by: FAMILY MEDICINE

## 2025-03-03 RX ORDER — OMEPRAZOLE 40 MG/1
40 CAPSULE, DELAYED RELEASE ORAL DAILY
Qty: 30 CAPSULE | Refills: 2 | Status: SHIPPED | OUTPATIENT
Start: 2025-03-03

## 2025-03-03 RX ORDER — CYCLOBENZAPRINE HCL 5 MG
5 TABLET ORAL NIGHTLY PRN
Qty: 30 TABLET | Refills: 0 | Status: SHIPPED | OUTPATIENT
Start: 2025-03-03

## 2025-03-03 NOTE — PROGRESS NOTES
Subjective:   Patient ID: Kayden Gomez is a 54 year old male.    Patient is here for follow up for hx of low back pain. Pt was doing some heavy lifting and aggravated his back. Patient is requesting refills on his flexeril. Still has naprosyn. The patient states he also missed work on Friday and needs a note.  Pt also has had hx of GERD and has had some heartburn recently. Requesting refill on medication he had in past.  Discussed colon screening also and declined at this time.   He will consider this.        History/Other:   Review of Systems  Current Outpatient Medications   Medication Sig Dispense Refill    cyclobenzaprine 5 MG Oral Tab Take 1 tablet (5 mg total) by mouth nightly as needed for Muscle spasms. 30 tablet 0    Omeprazole 40 MG Oral Capsule Delayed Release Take 1 capsule (40 mg total) by mouth daily. 30 capsule 2    zolpidem 10 MG Oral Tab Take 1 tablet (10 mg total) by mouth nightly. 90 tablet 0    naproxen 500 MG Oral Tab Take 1 tablet (500 mg total) by mouth 2 (two) times daily as needed (take with food). 60 tablet 0    benzonatate (TESSALON PERLES) 100 MG Oral Cap Take 1 capsule (100 mg total) by mouth 3 (three) times daily as needed for cough. (Patient not taking: Reported on 3/3/2025) 30 capsule 0     Allergies:Allergies[1]    Objective:   Physical Exam  Constitutional:       Appearance: Normal appearance.   Abdominal:      General: Abdomen is flat.      Tenderness: There is no abdominal tenderness.   Musculoskeletal:      Comments: Lower back/ some tightness of the right lumbar area. ROM intact. No sig tenderness.  SLR raising: negative     Neurological:      Mental Status: He is alert.         Assessment & Plan:   1. Dorsalgia of lumbar region:  - After discussion with patient, flexeril as needed/ naprosyn as discussed; To call if worse or not better; Follow up in one week if not resolved. Note for work provided        2. Gastroesophageal reflux disease, unspecified whether esophagitis  present:  - After discussion with patient, omeprazole as directed/needed; Encouraged bland diet and lifestyle changes; To call if worse or not better; encouraged follow up with GI for colonoscopy/ colon screening.         No orders of the defined types were placed in this encounter.      Meds This Visit:  Requested Prescriptions     Signed Prescriptions Disp Refills    cyclobenzaprine 5 MG Oral Tab 30 tablet 0     Sig: Take 1 tablet (5 mg total) by mouth nightly as needed for Muscle spasms.    Omeprazole 40 MG Oral Capsule Delayed Release 30 capsule 2     Sig: Take 1 capsule (40 mg total) by mouth daily.       Imaging & Referrals:  None         [1] No Known Allergies

## 2025-03-05 ENCOUNTER — TELEPHONE (OUTPATIENT)
Dept: FAMILY MEDICINE CLINIC | Facility: CLINIC | Age: 55
End: 2025-03-05

## 2025-03-05 NOTE — TELEPHONE ENCOUNTER
Per patient, he saw Dr Levin 03/03/2025 and have given him an excuse letter for his work but they need another letter that patient can go back to work 03/04/2025 without restriction. Please call patient when ready to  today if possible.

## 2025-04-08 ENCOUNTER — OFFICE VISIT (OUTPATIENT)
Dept: FAMILY MEDICINE CLINIC | Facility: CLINIC | Age: 55
End: 2025-04-08

## 2025-04-08 VITALS
SYSTOLIC BLOOD PRESSURE: 101 MMHG | HEART RATE: 71 BPM | DIASTOLIC BLOOD PRESSURE: 64 MMHG | HEIGHT: 70 IN | WEIGHT: 184 LBS | BODY MASS INDEX: 26.34 KG/M2

## 2025-04-08 DIAGNOSIS — M54.50 CHRONIC BILATERAL LOW BACK PAIN WITHOUT SCIATICA: Primary | ICD-10-CM

## 2025-04-08 DIAGNOSIS — G89.29 CHRONIC BILATERAL LOW BACK PAIN WITHOUT SCIATICA: Primary | ICD-10-CM

## 2025-04-08 PROCEDURE — 3078F DIAST BP <80 MM HG: CPT | Performed by: FAMILY MEDICINE

## 2025-04-08 PROCEDURE — 3074F SYST BP LT 130 MM HG: CPT | Performed by: FAMILY MEDICINE

## 2025-04-08 PROCEDURE — 99213 OFFICE O/P EST LOW 20 MIN: CPT | Performed by: FAMILY MEDICINE

## 2025-04-08 PROCEDURE — 3008F BODY MASS INDEX DOCD: CPT | Performed by: FAMILY MEDICINE

## 2025-04-08 NOTE — PROGRESS NOTES
Blood pressure 101/64, pulse 71, height 5' 10\" (1.778 m), weight 184 lb (83.5 kg).      Requesting note for work felt nauseous and febrile 3 days ago now improved.  Would like to go back today denies fever or pain.    Also with intermittent low back pain for the past 4 months.  No pain at this time.  Denies any traumatic injury in the past year.  Denies any pain or weakness in the legs.    Objective L4-S1 motor intact on exam    Assessment #1 history of nausea #2 low back pain recurring and chronic    Plan note given for work physical therapy order follow-up if no improvement    Also advised patient to have physical scheduled with PCP Dr. Levin

## 2025-04-10 DIAGNOSIS — G47.00 INSOMNIA, UNSPECIFIED TYPE: ICD-10-CM

## 2025-04-14 RX ORDER — ZOLPIDEM TARTRATE 10 MG/1
10 TABLET ORAL NIGHTLY
Qty: 30 TABLET | Refills: 2 | Status: SHIPPED | OUTPATIENT
Start: 2025-04-14

## 2025-04-14 NOTE — TELEPHONE ENCOUNTER
Message noted: Chart reviewed and may refill medication as requested. Script sent to listed pharmacy by secure method.    Pt notified through [employer]

## 2025-04-14 NOTE — TELEPHONE ENCOUNTER
Please review; protocol failed/No Protocol      Recent Fills: 01/14/2025, 02/12/2025, 03/12/2025    Last Rx Written: 12/18/2024    Last Office Visit: 03/03/2025

## 2025-07-09 DIAGNOSIS — G47.00 INSOMNIA, UNSPECIFIED TYPE: ICD-10-CM

## 2025-07-09 RX ORDER — ZOLPIDEM TARTRATE 10 MG/1
10 TABLET ORAL NIGHTLY
Qty: 30 TABLET | Refills: 0 | Status: SHIPPED | OUTPATIENT
Start: 2025-07-09

## 2025-07-09 NOTE — TELEPHONE ENCOUNTER
Message noted: Chart reviewed and may refill medication as requested. Script sent to listed pharmacy by secure method.    Pt notified through [employer]

## 2025-08-04 ENCOUNTER — OFFICE VISIT (OUTPATIENT)
Dept: FAMILY MEDICINE CLINIC | Facility: CLINIC | Age: 55
End: 2025-08-04

## 2025-08-04 VITALS
HEART RATE: 57 BPM | WEIGHT: 175 LBS | SYSTOLIC BLOOD PRESSURE: 127 MMHG | HEIGHT: 70 IN | TEMPERATURE: 99 F | BODY MASS INDEX: 25.05 KG/M2 | DIASTOLIC BLOOD PRESSURE: 70 MMHG | OXYGEN SATURATION: 94 %

## 2025-08-04 DIAGNOSIS — R06.02 SHORTNESS OF BREATH: Primary | ICD-10-CM

## 2025-08-04 DIAGNOSIS — G47.00 INSOMNIA, UNSPECIFIED TYPE: ICD-10-CM

## 2025-08-04 DIAGNOSIS — Z20.822 SUSPECTED COVID-19 VIRUS INFECTION: ICD-10-CM

## 2025-08-04 LAB
COVID19 BINAX NOW ANTIGEN: NOT DETECTED
OPERATOR ID: NORMAL

## 2025-08-04 PROCEDURE — 3078F DIAST BP <80 MM HG: CPT

## 2025-08-04 PROCEDURE — 3074F SYST BP LT 130 MM HG: CPT

## 2025-08-04 PROCEDURE — 99214 OFFICE O/P EST MOD 30 MIN: CPT

## 2025-08-04 PROCEDURE — 3008F BODY MASS INDEX DOCD: CPT

## 2025-08-04 RX ORDER — ALBUTEROL SULFATE 90 UG/1
2 INHALANT RESPIRATORY (INHALATION) EVERY 4 HOURS PRN
Qty: 18 G | Refills: 0 | Status: SHIPPED | OUTPATIENT
Start: 2025-08-04

## 2025-08-04 RX ORDER — ZOLPIDEM TARTRATE 10 MG/1
10 TABLET ORAL NIGHTLY
Qty: 30 TABLET | Refills: 0 | Status: SHIPPED | OUTPATIENT
Start: 2025-08-04

## (undated) NOTE — LETTER
4/8/2025          To Whom It May Concern:    Kayden Gomez is currently under my medical care and may not return to work at this time.    Please excuse Kayden for 1 days.  He may return to work on 04/08/2025.  Activity is restricted as follows: none.    If you require additional information please contact our office.        Sincerely,      Andrew Sawyer, DO          Document generated by:  Andrew Sawyer DO

## (undated) NOTE — LETTER
9/26/2023          To Whom It May Concern: Hamida Willson is currently under my medical care. Please excuse the patient from work missed 9/21, 9/22, and 9/25 as the patient has had right shoulder/ arm pain  May return to work on 9/26/23 without restrictions. If you require additional information please contact our office.         Sincerely,    Nimco Kingsley MD          Document generated by:  Nimco Kingsley MD

## (undated) NOTE — MR AVS SNAPSHOT
[facility] - [place] [address] [id number] [phone]               Thank you for choosing us for your health care visit with Josette Dakins, PA.   We are glad to serve you and happy to provide you with this summary of recent med list.                amoxicillin 875 MG Tabs   Take 1 tablet (875 mg total) by mouth 2 (two) times daily. Commonly known as:  AMOXIL           Fluticasone Propionate 50 MCG/ACT Susp   2 sprays by Each Nare route daily.    Commonly known as:  FL

## (undated) NOTE — LETTER
7/9/2024          To Whom It May Concern:    Kayden Gomez is currently under my medical care and may return to work at this time.    Please excuse Kayden for 1 days.  He may return to work on 07/10/2024.  If you require additional information please contact our office.        Sincerely,    Rubio Lorenzo MD

## (undated) NOTE — LETTER
10/17/2024          To Whom It May Concern:    Kayden Gomez is currently under my medical care and may not return to work at this time.    Please excuse Kayden for 1 day.  He may return to work on 10/18/2024.  Activity is restricted as follows: none.    If you require additional information please contact our office.        Sincerely,    MARISSA Julian, FNP-BC              Document generated by:  MARISSA Julian

## (undated) NOTE — LETTER
6/22/2020          To Whom It May Concern: Cora Moise is currently under my medical care. Please excuse the patient from work missed last Friday, June 19, 2020 as the patient has been ill with a medical issue. May return to work when well.     If

## (undated) NOTE — LETTER
3/4/2021          To Whom It May Concern: Kade Cruz is currently under my medical care. Please excuse the patient from work missed on Friday, 2/26/21 as the patient had a medical issue.       If you require additional information please contact o

## (undated) NOTE — LETTER
4/4/2018              [address] [place] [id number]         To whom it may concern,    Naresh Nicholas was seen in the office today. Please excuse him from work 4/2/18-4/3/18 due to illness.       Sincerely,

## (undated) NOTE — MR AVS SNAPSHOT
[facility] \[facility] [place] [facility]"" - [place] [address] [address]  [id number] [phone]               Thank you for choosing us for your health care visit with Luiz Greene MD.  We are glad to serve you and happy to provide you with this summary of y Return in about 1 week (around 2/16/2017), or if symptoms worsen or fail to improve.          Dillon                  Visit [facility] online at  [email or web address]

## (undated) NOTE — LETTER
6/7/2022          To Whom It May Concern: Bryan Hardy is currently under my medical care. Please excuse the patient from work missed until further notice as the patient has been ill. If you require additional information please contact our office.         Sincerely,    Quinn Posey MD          Document generated by:  Quinn Posey MD

## (undated) NOTE — LETTER
8/7/2024          To Whom It May Concern:    Kayden Gomez is currently under my medical care and may not return to work at this time.    Please excuse Kayden for 2 days.  He may return to work on 8/7/24.  Activity is restricted as follows: none.    If you require additional information please contact our office.        Sincerely,    MARISSA Julian, FNP-BC              Document generated by:  MARISSA Julian

## (undated) NOTE — Clinical Note
5/25/2017          To Whom It May Concern: Robbi Ceja is currently under my medical care. Please excuse the patient from work missed for 5/19/17 as he has been ill. If you require additional information please contact our office.         Sin

## (undated) NOTE — LETTER
9/30/2019          To Whom It May Concern: Columba Cabrera is currently under my medical care. Please excuse the patient from work missed on 9/26/19 as he had a medical issue  May return to work.   If you require additional information please contact o

## (undated) NOTE — LETTER
10/18/2024          To Whom It May Concern:    Kayden Gomez is currently under my medical care and may not return to work at this time.    Please excuse Kayden for 2 days (10/10 & 10/11).    If you require additional information please contact our office.        Sincerely,    MARISSA Julian, FNP-BC              Document generated by:  MARISSA Julian

## (undated) NOTE — LETTER
December 7, 2017         Nicolle Alvarado MD  [address] [facility]      Patient: Aga Jeffrey   YOB: 1970   Date of Visit: 12/7/2017       Dear Dr. Arcelia Botello MD,    I saw your patient, Aga Jeffrey, on 12/7/2017.  E

## (undated) NOTE — LETTER
AUTHORIZATION FOR SURGICAL OPERATION OR OTHER PROCEDURE    1.  I hereby authorize Dr. Mayra Nunn, and [facility] assigned to my case to perform the following operation and/or procedure at the [facility]:    Destruction of benign skin yousuf Time:  ________ A. M.  P.M.        Patient Name:  ______________________________________________________  (please print)      Patient signature:  ___________________________________________________             Relationship to Patient:           []  Parent

## (undated) NOTE — LETTER
1/29/2024          To Whom It May Concern:    Kayden Gomez is currently under my medical care and may return to work at this time.    Please excuse Kayden for 2 days 1/26/24  He may return to work on 1/29/24.  Activity is restricted as follows: limit use of left hand, x-ray result pending   If you require additional information please contact our office.        Sincerely,      MARISSA Gomez          Document generated by:  MARISSA Gomez

## (undated) NOTE — LETTER
9/21/2021          To Whom It May Concern: Corrinne Mitten is currently under my medical care. Please excuse the patient from work missed on 9/20/21 as the patient has been ill. May return to work when well and recovered.     If you require additiona

## (undated) NOTE — LETTER
7/8/2024          To Whom It May Concern:    Kayden Gomez is currently under my medical care.  Please excuse the patient from work missed as the patient has been ill with a medical issue.   May return to work on Wednesday, 7/10/24.     If you require additional information please contact our office.        Sincerely,    Du Levin MD          Document generated by:  Du Levin MD

## (undated) NOTE — LETTER
5/20/2021          To Whom It May Concern: Juan Carlos Huynh is currently under my medical care.   Please be advised that due to the patient's recent illness and the COVID 19 pandemic, it would be advisable for the patient to self-isolate at home during t

## (undated) NOTE — LETTER
3/3/2025          To Whom It May Concern:    Kayden Gomez is currently under my medical care.  Please excuse the patient from work missed on Friday, 2/28/25 as the patient has had a medical issue related to his back.      If you require additional information please contact our office.        Sincerely,    Du Levin MD          Document generated by:  Du Levin MD

## (undated) NOTE — LETTER
5/24/2021          To Whom It May Concern: Viviane Jeffrey is currently under my medical care. Please excuse the patient from work missed on 5/19, 5/20, and 5/21 as the patient has been ill. May return to work on 5/24/21.    If you require additional

## (undated) NOTE — LETTER
12/28/2023          To Whom It May Concern: Chad Maguire is currently under my medical care. Please excuse the patient from work missed on 12/27- 12/28/23 as the patient has been ill. May return to work on 12/29/23. If you require additional information please contact our office.         Sincerely,    Ralston Riedel, MD          Document generated by:  Ralston Riedel, MD

## (undated) NOTE — LETTER
3/5/2025          To Whom It May Concern:    Kayden Gomez is currently under my medical care  Please excuse the patient from work missed on Friday, 2/28/25 as the patient has had a medical issue related to his back.   May return to work on 3/4/25 without restrictions      If you require additional information please contact our office.        Sincerely,    Du Levin MD          Document generated by:  Du Levin MD

## (undated) NOTE — LETTER
1/31/2024          To Whom It May Concern:    Kayden Gomez is currently under my medical care and may return to work at this time.    He may return to work on 1/29/24. Patient had an injury to his left hand on 1/23/24. X-ray completed on 1/30/24 and was normal.   Activity is restricted as follows: none. Patient cleared to return to work with no restrictions     If you require additional information please contact our office.        Sincerely,      MARISSA Gomez          Document generated by:  MARISSA Gomez

## (undated) NOTE — LETTER
7/27/2018              [address] [place] [id number]         To whom it may concern,    Charisma Maddox was seen today in the office for follow up. Please excuse him from work 7/20/18.         Sincerely,        Blake Lowe

## (undated) NOTE — LETTER
7/9/2024          To Whom It May Concern:    Kayden Gomez is currently under my medical care and may return to work at this time.    Please excuse Kayden for 1 days.  He may return to work on 07/10/2024.  Activity is restricted as follows: No restrictions     If you require additional information please contact our office.        Sincerely,    Rubio Lorenzo MD

## (undated) NOTE — LETTER
8/14/2018          To Whom It May Concern: Paulino Bonilla is currently under my medical care. Please excuse the patient from work missed 8/13- 8/14/18 as he has been ill. May return to work when well.       If you require additional information plea

## (undated) NOTE — LETTER
1/25/2018          To Whom It May Concern: Jacquie Damon is currently under my medical care and was evaluated by me in my office today. If you require additional information please contact our office.         Sincerely,    Roberto Greene DPM

## (undated) NOTE — LETTER
5/19/2021          To Whom It May Concern: Danley Sever is currently under my medical care. Please excuse the patient from work missed on 5/17- 5/18/21 as the patient has been ill. May return to work when well and fever free.      If you require a

## (undated) NOTE — LETTER
12/30/2019          To Whom It May Concern: Gibson Him is currently under my medical care. Please excuse the patient from work missed on Monday, 12/23/19 as he was ill. May return to work when well.     If you require additional information plea

## (undated) NOTE — MR AVS SNAPSHOT
[facility] \[facility] [place] [facility]\"" - [place] [address] [address] [id number]  [phone]               Thank you for choosing us for your health care visit with Memo Perkins MD.  We are glad to serve you and happy to provide you with this summary of y or be assured that none are required. You can then schedule your appointment. Failure to obtain required authorization numbers can create reimbursement difficulties for you.     Assoc Dx:  Right serous otitis media, unspecified chronicity [H65.91]

## (undated) NOTE — Clinical Note
4/18/2017              [address] [place] [id number]         To whom it may concern,    Mónica Casey was seen in the office 4/17/17. Please excuse him from work.       Sincerely,        CHEYENNE Moore  [facility]

## (undated) NOTE — LETTER
1/25/2023          To Whom It May Concern: Benjie Washington is currently under my medical care. Please excuse the patient from work missed on Friday,  1/20/23 as the patient had a medical issues that day. If you require additional information please contact our office.         Sincerely,    Otilio Garcia MD          Document generated by:  Otilio Garcia MD

## (undated) NOTE — LETTER
Date & Time: 11/21/2024, 9:19 AM  Patient: Kayden Gomez  Encounter Provider(s):    Malvin Elizabeth MD       To Whom It May Concern:    Kayden Gomez was seen and treated in our department on 11/21/2024. He can return to work with these limitations: Must keep wound clean and dry until sutures are removed .    If you have any questions or concerns, please do not hesitate to call.        _____________________________  Physician/APC Signature